# Patient Record
Sex: MALE | Race: WHITE | Employment: FULL TIME | URBAN - METROPOLITAN AREA
[De-identification: names, ages, dates, MRNs, and addresses within clinical notes are randomized per-mention and may not be internally consistent; named-entity substitution may affect disease eponyms.]

---

## 2017-05-17 LAB
CHOLESTEROL, TOTAL: NORMAL MG/DL
CHOLESTEROL/HDL RATIO: NORMAL
HDLC SERPL-MCNC: NORMAL MG/DL (ref 35–70)
LDL CHOLESTEROL CALCULATED: 113 MG/DL (ref 0–160)
TRIGL SERPL-MCNC: NORMAL MG/DL
VLDLC SERPL CALC-MCNC: NORMAL MG/DL

## 2017-06-02 ENCOUNTER — TELEPHONE (OUTPATIENT)
Dept: FAMILY MEDICINE CLINIC | Age: 52
End: 2017-06-02

## 2017-06-08 ENCOUNTER — OFFICE VISIT (OUTPATIENT)
Dept: FAMILY MEDICINE CLINIC | Age: 52
End: 2017-06-08

## 2017-06-08 VITALS
HEART RATE: 56 BPM | WEIGHT: 218 LBS | SYSTOLIC BLOOD PRESSURE: 124 MMHG | RESPIRATION RATE: 10 BRPM | DIASTOLIC BLOOD PRESSURE: 74 MMHG | HEIGHT: 70 IN | BODY MASS INDEX: 31.21 KG/M2

## 2017-06-08 DIAGNOSIS — E78.5 HYPERLIPIDEMIA LDL GOAL <100: ICD-10-CM

## 2017-06-08 DIAGNOSIS — R97.20 ELEVATED PSA, LESS THAN 10 NG/ML: Primary | ICD-10-CM

## 2017-06-08 PROCEDURE — 99214 OFFICE O/P EST MOD 30 MIN: CPT | Performed by: FAMILY MEDICINE

## 2017-06-08 RX ORDER — M-VIT,TX,IRON,MINS/CALC/FOLIC 27MG-0.4MG
1 TABLET ORAL DAILY
COMMUNITY

## 2017-06-08 RX ORDER — CIPROFLOXACIN 500 MG/1
500 TABLET, FILM COATED ORAL 2 TIMES DAILY
Qty: 28 TABLET | Refills: 0 | Status: SHIPPED | OUTPATIENT
Start: 2017-06-08 | End: 2017-09-28 | Stop reason: ALTCHOICE

## 2017-06-08 ASSESSMENT — PATIENT HEALTH QUESTIONNAIRE - PHQ9
2. FEELING DOWN, DEPRESSED OR HOPELESS: 0
SUM OF ALL RESPONSES TO PHQ9 QUESTIONS 1 & 2: 0
1. LITTLE INTEREST OR PLEASURE IN DOING THINGS: 0
SUM OF ALL RESPONSES TO PHQ QUESTIONS 1-9: 0

## 2017-06-08 ASSESSMENT — ENCOUNTER SYMPTOMS
EYE PAIN: 0
SORE THROAT: 0
ABDOMINAL PAIN: 0
CONSTIPATION: 0
COUGH: 0
BACK PAIN: 0
TROUBLE SWALLOWING: 0
NAUSEA: 0
CHEST TIGHTNESS: 0
BLOOD IN STOOL: 0
SHORTNESS OF BREATH: 0

## 2017-09-13 DIAGNOSIS — R97.20 ELEVATED PSA, LESS THAN 10 NG/ML: ICD-10-CM

## 2017-09-14 ENCOUNTER — TELEPHONE (OUTPATIENT)
Dept: FAMILY MEDICINE CLINIC | Age: 52
End: 2017-09-14

## 2017-09-14 DIAGNOSIS — R97.20 ELEVATED PSA, LESS THAN 10 NG/ML: Primary | ICD-10-CM

## 2017-09-14 DIAGNOSIS — Z80.42 FAMILY HISTORY OF PROSTATE CANCER: ICD-10-CM

## 2017-09-28 ENCOUNTER — OFFICE VISIT (OUTPATIENT)
Dept: UROLOGY | Age: 52
End: 2017-09-28
Payer: COMMERCIAL

## 2017-09-28 VITALS
WEIGHT: 206 LBS | HEIGHT: 70 IN | SYSTOLIC BLOOD PRESSURE: 116 MMHG | DIASTOLIC BLOOD PRESSURE: 78 MMHG | BODY MASS INDEX: 29.49 KG/M2

## 2017-09-28 DIAGNOSIS — R97.20 ELEVATED PSA: Primary | ICD-10-CM

## 2017-09-28 LAB
BILIRUBIN URINE: NEGATIVE
BLOOD URINE, POC: NORMAL
CHARACTER, URINE: CLEAR
COLOR, URINE: YELLOW
GLUCOSE URINE: NEGATIVE MG/DL
KETONES, URINE: NEGATIVE
LEUKOCYTE CLUMPS, URINE: NEGATIVE
NITRITE, URINE: NEGATIVE
PH, URINE: 5.5
PROTEIN, URINE: NEGATIVE MG/DL
SPECIFIC GRAVITY, URINE: <= 1.005 (ref 1–1.03)
UROBILINOGEN, URINE: 0.2 EU/DL

## 2017-09-28 PROCEDURE — 99244 OFF/OP CNSLTJ NEW/EST MOD 40: CPT | Performed by: UROLOGY

## 2017-09-28 PROCEDURE — 81003 URINALYSIS AUTO W/O SCOPE: CPT | Performed by: UROLOGY

## 2017-09-28 ASSESSMENT — ENCOUNTER SYMPTOMS
COLOR CHANGE: 0
CONSTIPATION: 0
EYE PAIN: 0
BACK PAIN: 0
EYE ITCHING: 0
CHEST TIGHTNESS: 0
DIARRHEA: 0
SHORTNESS OF BREATH: 0

## 2017-11-29 LAB — PROSTATE SPECIFIC ANTIGEN: 4.4 NG/ML

## 2018-03-08 LAB — PROSTATE SPECIFIC ANTIGEN: 3.47 NG/ML

## 2018-03-16 ENCOUNTER — OFFICE VISIT (OUTPATIENT)
Dept: UROLOGY | Age: 53
End: 2018-03-16
Payer: COMMERCIAL

## 2018-03-16 VITALS
DIASTOLIC BLOOD PRESSURE: 76 MMHG | HEIGHT: 70 IN | WEIGHT: 200 LBS | BODY MASS INDEX: 28.63 KG/M2 | SYSTOLIC BLOOD PRESSURE: 122 MMHG

## 2018-03-16 DIAGNOSIS — R97.20 ELEVATED PSA: Primary | ICD-10-CM

## 2018-03-16 LAB
BILIRUBIN URINE: NEGATIVE
BLOOD URINE, POC: NEGATIVE
CHARACTER, URINE: CLEAR
COLOR, URINE: YELLOW
GLUCOSE URINE: NEGATIVE MG/DL
KETONES, URINE: NEGATIVE
LEUKOCYTE CLUMPS, URINE: NEGATIVE
NITRITE, URINE: NEGATIVE
PH, URINE: 6
PROTEIN, URINE: NEGATIVE MG/DL
SPECIFIC GRAVITY, URINE: 1.02 (ref 1–1.03)
UROBILINOGEN, URINE: 0.2 EU/DL

## 2018-03-16 PROCEDURE — 99213 OFFICE O/P EST LOW 20 MIN: CPT | Performed by: NURSE PRACTITIONER

## 2018-03-16 PROCEDURE — 81003 URINALYSIS AUTO W/O SCOPE: CPT | Performed by: NURSE PRACTITIONER

## 2018-03-16 NOTE — PROGRESS NOTES
examination with appropriate mood and affect. HENT:   Head:        Normocephalic and atraumatic. Mouth/Throat:         Mucous membranes are normal.   Eyes:         EOM are normal. No scleral icterus. PERRLA. Neck:        Supple, symmetrical, trachea midline  Pulmonary/Chest:      Chest symmetric with normal A/P diameter. Normal respiratory rate and rhthym. No use of accessory muscles. Abdominal:         Soft. No tenderness. Bowel sounds present. Musculoskeletal:         Normal range of motion. No edema or tenderness of lower extremities. Extremities: No cyanosis, clubbing, or edema present. Neurological:        Alert and oriented. No cranial nerve deficit. [facility]:        Normal mood and affect. Labs   Urine dip demonstrates   Results for POC orders placed in visit on 03/16/18   POCT Urinalysis No Micro (Auto)   Result Value Ref Range    Glucose, Ur Negative NEGATIVE mg/dl    Bilirubin Urine Negative     Ketones, Urine Negative NEGATIVE    Specific Gravity, Urine 1.025 1.002 - 1.03    Blood, UA POC Negative NEGATIVE    pH, Urine 6.00 5.0 - 9.0    Protein, Urine Negative NEGATIVE mg/dl    Urobilinogen, Urine 0.20 0.0 - 1.0 eu/dl    Nitrite, Urine Negative NEGATIVE    Leukocyte Clumps, Urine Negative NEGATIVE    Color, Urine Yellow YELLOW-STR    Character, Urine Clear [id number]       Patients recent PSA values are as follows  Lab Results   Component Value Date    PSA 4.40 11/29/2017        Recent BUN/Creatinine:  Lab Results   Component Value Date    BUN 16 09/08/2012    CREATININE 1.0 09/08/2012       Radiology  No recent imaging        Assessment & Plan:     Elevated PSA    Kamran Hope follows up today for elevated PSA's. His PSA remains elevated at 3.47. His PSA did jump up to 4.4, however came back down. Compared to 6 months ago, PSA is very similar. Plan to check PSA every 2 months, if continual trend upwards, will need to call patient to schedule for prostate biopsy.  We did discuss

## 2018-05-22 LAB — PROSTATE SPECIFIC ANTIGEN: 4.11 NG/ML

## 2018-07-31 LAB — PROSTATE SPECIFIC ANTIGEN: 3.25 NG/ML

## 2018-10-31 LAB — PROSTATE SPECIFIC ANTIGEN: 3.07 NG/ML

## 2018-11-09 ENCOUNTER — OFFICE VISIT (OUTPATIENT)
Dept: UROLOGY | Age: 53
End: 2018-11-09
Payer: COMMERCIAL

## 2018-11-09 VITALS
HEIGHT: 70 IN | BODY MASS INDEX: 32.07 KG/M2 | DIASTOLIC BLOOD PRESSURE: 86 MMHG | SYSTOLIC BLOOD PRESSURE: 128 MMHG | WEIGHT: 224 LBS

## 2018-11-09 DIAGNOSIS — R97.20 ELEVATED PSA: Primary | ICD-10-CM

## 2018-11-09 LAB
BILIRUBIN URINE: NEGATIVE
BLOOD URINE, POC: NEGATIVE
CHARACTER, URINE: CLEAR
COLOR, URINE: YELLOW
GLUCOSE URINE: NEGATIVE MG/DL
KETONES, URINE: NEGATIVE
LEUKOCYTE CLUMPS, URINE: NEGATIVE
NITRITE, URINE: NEGATIVE
PH, URINE: 5.5
PROTEIN, URINE: NEGATIVE MG/DL
SPECIFIC GRAVITY, URINE: 1.01 (ref 1–1.03)
UROBILINOGEN, URINE: 0.2 EU/DL

## 2018-11-09 PROCEDURE — 99213 OFFICE O/P EST LOW 20 MIN: CPT | Performed by: NURSE PRACTITIONER

## 2018-11-09 PROCEDURE — 81003 URINALYSIS AUTO W/O SCOPE: CPT | Performed by: NURSE PRACTITIONER

## 2019-01-02 ENCOUNTER — TELEPHONE (OUTPATIENT)
Dept: UROLOGY | Age: 54
End: 2019-01-02

## 2019-01-02 RX ORDER — SILDENAFIL CITRATE 20 MG/1
20-100 TABLET ORAL PRN
Qty: 30 TABLET | Refills: 3 | Status: SHIPPED | OUTPATIENT
Start: 2019-01-02 | End: 2019-05-22 | Stop reason: SDUPTHER

## 2019-02-16 ENCOUNTER — HOSPITAL ENCOUNTER (OUTPATIENT)
Age: 54
Setting detail: OUTPATIENT SURGERY
Discharge: HOME | End: 2019-02-16
Attending: OPHTHALMOLOGY | Admitting: OPHTHALMOLOGY
Payer: COMMERCIAL

## 2019-02-16 ENCOUNTER — ANESTHESIA (OUTPATIENT)
Dept: OPERATING ROOM | Age: 54
End: 2019-02-16
Payer: COMMERCIAL

## 2019-02-16 ENCOUNTER — ANESTHESIA EVENT (OUTPATIENT)
Dept: OPERATING ROOM | Age: 54
End: 2019-02-16
Payer: COMMERCIAL

## 2019-02-16 VITALS
SYSTOLIC BLOOD PRESSURE: 103 MMHG | HEART RATE: 60 BPM | OXYGEN SATURATION: 97 % | WEIGHT: 210 LBS | BODY MASS INDEX: 30.06 KG/M2 | DIASTOLIC BLOOD PRESSURE: 56 MMHG | RESPIRATION RATE: 16 BRPM | HEIGHT: 70 IN | TEMPERATURE: 97.7 F

## 2019-02-16 VITALS — DIASTOLIC BLOOD PRESSURE: 53 MMHG | SYSTOLIC BLOOD PRESSURE: 112 MMHG | OXYGEN SATURATION: 97 % | TEMPERATURE: 97.8 F

## 2019-02-16 PROBLEM — H33.021 RETINAL DETACHMENT OF RIGHT EYE WITH MULTIPLE RETINAL TEARS: Status: ACTIVE | Noted: 2019-02-16

## 2019-02-16 LAB
EKG ATRIAL RATE: 55 BPM
EKG P AXIS: 38 DEGREES
EKG P-R INTERVAL: 168 MS
EKG Q-T INTERVAL: 404 MS
EKG QRS DURATION: 84 MS
EKG QTC CALCULATION (BAZETT): 386 MS
EKG R AXIS: -8 DEGREES
EKG T AXIS: 13 DEGREES
EKG VENTRICULAR RATE: 55 BPM

## 2019-02-16 PROCEDURE — 3700000000 HC ANESTHESIA ATTENDED CARE: Performed by: OPHTHALMOLOGY

## 2019-02-16 PROCEDURE — 6370000000 HC RX 637 (ALT 250 FOR IP): Performed by: ANESTHESIOLOGY

## 2019-02-16 PROCEDURE — 2709999900 HC NON-CHARGEABLE SUPPLY: Performed by: OPHTHALMOLOGY

## 2019-02-16 PROCEDURE — 6370000000 HC RX 637 (ALT 250 FOR IP): Performed by: OPHTHALMOLOGY

## 2019-02-16 PROCEDURE — 7100000011 HC PHASE II RECOVERY - ADDTL 15 MIN: Performed by: OPHTHALMOLOGY

## 2019-02-16 PROCEDURE — 3600000004 HC SURGERY LEVEL 4 BASE: Performed by: OPHTHALMOLOGY

## 2019-02-16 PROCEDURE — 3600000014 HC SURGERY LEVEL 4 ADDTL 15MIN: Performed by: OPHTHALMOLOGY

## 2019-02-16 PROCEDURE — 93005 ELECTROCARDIOGRAM TRACING: CPT

## 2019-02-16 PROCEDURE — 2500000003 HC RX 250 WO HCPCS: Performed by: OPHTHALMOLOGY

## 2019-02-16 PROCEDURE — 6360000002 HC RX W HCPCS: Performed by: OPHTHALMOLOGY

## 2019-02-16 PROCEDURE — 3700000001 HC ADD 15 MINUTES (ANESTHESIA): Performed by: OPHTHALMOLOGY

## 2019-02-16 PROCEDURE — 6360000002 HC RX W HCPCS: Performed by: NURSE ANESTHETIST, CERTIFIED REGISTERED

## 2019-02-16 PROCEDURE — 2580000003 HC RX 258: Performed by: ANESTHESIOLOGY

## 2019-02-16 PROCEDURE — 2500000003 HC RX 250 WO HCPCS: Performed by: NURSE ANESTHETIST, CERTIFIED REGISTERED

## 2019-02-16 PROCEDURE — 7100000010 HC PHASE II RECOVERY - FIRST 15 MIN: Performed by: OPHTHALMOLOGY

## 2019-02-16 PROCEDURE — 2580000003 HC RX 258: Performed by: OPHTHALMOLOGY

## 2019-02-16 RX ORDER — SODIUM CHLORIDE 0.9 % (FLUSH) 0.9 %
10 SYRINGE (ML) INJECTION EVERY 12 HOURS SCHEDULED
Status: DISCONTINUED | OUTPATIENT
Start: 2019-02-16 | End: 2019-02-16 | Stop reason: HOSPADM

## 2019-02-16 RX ORDER — LIDOCAINE HYDROCHLORIDE 10 MG/ML
INJECTION, SOLUTION EPIDURAL; INFILTRATION; INTRACAUDAL; PERINEURAL PRN
Status: DISCONTINUED | OUTPATIENT
Start: 2019-02-16 | End: 2019-02-16 | Stop reason: SDUPTHER

## 2019-02-16 RX ORDER — TROPICAMIDE 10 MG/ML
1 SOLUTION/ DROPS OPHTHALMIC EVERY 5 MIN PRN
Status: COMPLETED | OUTPATIENT
Start: 2019-02-16 | End: 2019-02-16

## 2019-02-16 RX ORDER — LIDOCAINE HYDROCHLORIDE 10 MG/ML
1 INJECTION, SOLUTION EPIDURAL; INFILTRATION; INTRACAUDAL; PERINEURAL
Status: DISCONTINUED | OUTPATIENT
Start: 2019-02-16 | End: 2019-02-16 | Stop reason: HOSPADM

## 2019-02-16 RX ORDER — SODIUM CHLORIDE, SODIUM LACTATE, POTASSIUM CHLORIDE, CALCIUM CHLORIDE 600; 310; 30; 20 MG/100ML; MG/100ML; MG/100ML; MG/100ML
INJECTION, SOLUTION INTRAVENOUS CONTINUOUS
Status: DISCONTINUED | OUTPATIENT
Start: 2019-02-16 | End: 2019-02-16

## 2019-02-16 RX ORDER — PHENYLEPHRINE HYDROCHLORIDE 100 MG/ML
1 SOLUTION/ DROPS OPHTHALMIC EVERY 5 MIN PRN
Status: COMPLETED | OUTPATIENT
Start: 2019-02-16 | End: 2019-02-16

## 2019-02-16 RX ORDER — CEFAZOLIN SODIUM 500 MG/2.2ML
INJECTION, POWDER, FOR SOLUTION INTRAMUSCULAR; INTRAVENOUS PRN
Status: DISCONTINUED | OUTPATIENT
Start: 2019-02-16 | End: 2019-02-16 | Stop reason: ALTCHOICE

## 2019-02-16 RX ORDER — MIDAZOLAM HYDROCHLORIDE 1 MG/ML
1 INJECTION INTRAMUSCULAR; INTRAVENOUS EVERY 10 MIN PRN
Status: DISCONTINUED | OUTPATIENT
Start: 2019-02-16 | End: 2019-02-16 | Stop reason: HOSPADM

## 2019-02-16 RX ORDER — ACETAMINOPHEN 500 MG
1000 TABLET ORAL ONCE
Status: COMPLETED | OUTPATIENT
Start: 2019-02-16 | End: 2019-02-16

## 2019-02-16 RX ORDER — FENTANYL CITRATE 50 UG/ML
25 INJECTION, SOLUTION INTRAMUSCULAR; INTRAVENOUS EVERY 5 MIN PRN
Status: DISCONTINUED | OUTPATIENT
Start: 2019-02-16 | End: 2019-02-16 | Stop reason: HOSPADM

## 2019-02-16 RX ORDER — SODIUM CHLORIDE, SODIUM LACTATE, POTASSIUM CHLORIDE, CALCIUM CHLORIDE 600; 310; 30; 20 MG/100ML; MG/100ML; MG/100ML; MG/100ML
INJECTION, SOLUTION INTRAVENOUS CONTINUOUS
Status: DISCONTINUED | OUTPATIENT
Start: 2019-02-16 | End: 2019-02-16 | Stop reason: HOSPADM

## 2019-02-16 RX ORDER — ATROPINE SULFATE 10 MG/ML
1 SOLUTION/ DROPS OPHTHALMIC
Status: COMPLETED | OUTPATIENT
Start: 2019-02-16 | End: 2019-02-16

## 2019-02-16 RX ORDER — TOBRAMYCIN AND DEXAMETHASONE 3; 1 MG/ML; MG/ML
1 SUSPENSION/ DROPS OPHTHALMIC
Status: COMPLETED | OUTPATIENT
Start: 2019-02-16 | End: 2019-02-16

## 2019-02-16 RX ORDER — SODIUM CHLORIDE 0.9 % (FLUSH) 0.9 %
10 SYRINGE (ML) INJECTION PRN
Status: DISCONTINUED | OUTPATIENT
Start: 2019-02-16 | End: 2019-02-16 | Stop reason: HOSPADM

## 2019-02-16 RX ORDER — ERYTHROMYCIN 5 MG/G
OINTMENT OPHTHALMIC PRN
Status: DISCONTINUED | OUTPATIENT
Start: 2019-02-16 | End: 2019-02-16 | Stop reason: ALTCHOICE

## 2019-02-16 RX ORDER — BUPIVACAINE HYDROCHLORIDE 7.5 MG/ML
INJECTION, SOLUTION EPIDURAL; RETROBULBAR PRN
Status: DISCONTINUED | OUTPATIENT
Start: 2019-02-16 | End: 2019-02-16 | Stop reason: ALTCHOICE

## 2019-02-16 RX ORDER — PROPOFOL 10 MG/ML
INJECTION, EMULSION INTRAVENOUS PRN
Status: DISCONTINUED | OUTPATIENT
Start: 2019-02-16 | End: 2019-02-16 | Stop reason: SDUPTHER

## 2019-02-16 RX ORDER — LIDOCAINE HYDROCHLORIDE 20 MG/ML
INJECTION, SOLUTION INFILTRATION; PERINEURAL PRN
Status: DISCONTINUED | OUTPATIENT
Start: 2019-02-16 | End: 2019-02-16 | Stop reason: ALTCHOICE

## 2019-02-16 RX ADMIN — PHENYLEPHRINE HYDROCHLORIDE 1 DROP: 100 SOLUTION/ DROPS OPHTHALMIC at 07:24

## 2019-02-16 RX ADMIN — PHENYLEPHRINE HYDROCHLORIDE 1 DROP: 100 SOLUTION/ DROPS OPHTHALMIC at 07:36

## 2019-02-16 RX ADMIN — SODIUM CHLORIDE, POTASSIUM CHLORIDE, SODIUM LACTATE AND CALCIUM CHLORIDE: 600; 310; 30; 20 INJECTION, SOLUTION INTRAVENOUS at 07:40

## 2019-02-16 RX ADMIN — TOBRAMYCIN AND DEXAMETHASONE 1 DROP: 3; 1 SUSPENSION/ DROPS OPHTHALMIC at 07:37

## 2019-02-16 RX ADMIN — ACETAMINOPHEN 1000 MG: 500 TABLET ORAL at 09:44

## 2019-02-16 RX ADMIN — TROPICAMIDE 1 DROP: 10 SOLUTION/ DROPS OPHTHALMIC at 07:25

## 2019-02-16 RX ADMIN — TROPICAMIDE 1 DROP: 10 SOLUTION/ DROPS OPHTHALMIC at 07:30

## 2019-02-16 RX ADMIN — PROPOFOL 100 MG: 10 INJECTION, EMULSION INTRAVENOUS at 08:26

## 2019-02-16 RX ADMIN — LIDOCAINE HYDROCHLORIDE 25 MG: 10 INJECTION, SOLUTION EPIDURAL; INFILTRATION; INTRACAUDAL; PERINEURAL at 08:26

## 2019-02-16 RX ADMIN — TROPICAMIDE 1 DROP: 10 SOLUTION/ DROPS OPHTHALMIC at 07:36

## 2019-02-16 RX ADMIN — PHENYLEPHRINE HYDROCHLORIDE 1 DROP: 100 SOLUTION/ DROPS OPHTHALMIC at 07:30

## 2019-02-16 RX ADMIN — ATROPINE SULFATE 1 DROP: 10 SOLUTION OPHTHALMIC at 07:37

## 2019-02-16 ASSESSMENT — PULMONARY FUNCTION TESTS
PIF_VALUE: 1
PIF_VALUE: 0
PIF_VALUE: 1
PIF_VALUE: 0
PIF_VALUE: 0
PIF_VALUE: 1
PIF_VALUE: 0
PIF_VALUE: 1
PIF_VALUE: 0
PIF_VALUE: 1
PIF_VALUE: 0
PIF_VALUE: 1
PIF_VALUE: 0
PIF_VALUE: 1

## 2019-02-16 ASSESSMENT — PAIN SCALES - GENERAL
PAINLEVEL_OUTOF10: 0
PAINLEVEL_OUTOF10: 3
PAINLEVEL_OUTOF10: 0
PAINLEVEL_OUTOF10: 3

## 2019-02-16 ASSESSMENT — PAIN - FUNCTIONAL ASSESSMENT: PAIN_FUNCTIONAL_ASSESSMENT: 0-10

## 2019-05-22 RX ORDER — SILDENAFIL CITRATE 20 MG/1
20-100 TABLET ORAL PRN
Qty: 90 TABLET | Refills: 3 | Status: SHIPPED | OUTPATIENT
Start: 2019-05-22 | End: 2020-01-23 | Stop reason: SDUPTHER

## 2019-05-22 NOTE — TELEPHONE ENCOUNTER
Patient is asking for a increase in total number of pills he gets, instead of 3 he is asking for more.

## 2019-05-23 ENCOUNTER — TELEPHONE (OUTPATIENT)
Dept: UROLOGY | Age: 54
End: 2019-05-23

## 2019-09-12 ENCOUNTER — NURSE ONLY (OUTPATIENT)
Dept: LAB | Age: 54
End: 2019-09-12

## 2020-01-23 ENCOUNTER — TELEPHONE (OUTPATIENT)
Dept: UROLOGY | Age: 55
End: 2020-01-23

## 2020-01-23 RX ORDER — SILDENAFIL CITRATE 20 MG/1
20-100 TABLET ORAL PRN
Qty: 90 TABLET | Refills: 3 | Status: SHIPPED | OUTPATIENT
Start: 2020-01-23 | End: 2020-09-10 | Stop reason: SDUPTHER

## 2020-01-23 NOTE — TELEPHONE ENCOUNTER
Voicemail left for the patient stating new order was placed for PSA and to return the call to the office for any questions. toileting/walking/standing

## 2020-01-28 LAB — PROSTATE SPECIFIC ANTIGEN: 4.51 NG/ML

## 2020-01-30 ENCOUNTER — OFFICE VISIT (OUTPATIENT)
Dept: UROLOGY | Age: 55
End: 2020-01-30
Payer: COMMERCIAL

## 2020-01-30 VITALS
SYSTOLIC BLOOD PRESSURE: 128 MMHG | DIASTOLIC BLOOD PRESSURE: 88 MMHG | HEIGHT: 70 IN | BODY MASS INDEX: 31.5 KG/M2 | WEIGHT: 220 LBS

## 2020-01-30 PROBLEM — R97.20 ELEVATED PSA: Status: ACTIVE | Noted: 2020-01-30

## 2020-01-30 PROBLEM — N52.9 ERECTILE DYSFUNCTION: Status: ACTIVE | Noted: 2020-01-30

## 2020-01-30 PROCEDURE — 1036F TOBACCO NON-USER: CPT | Performed by: NURSE PRACTITIONER

## 2020-01-30 PROCEDURE — G8417 CALC BMI ABV UP PARAM F/U: HCPCS | Performed by: NURSE PRACTITIONER

## 2020-01-30 PROCEDURE — G8484 FLU IMMUNIZE NO ADMIN: HCPCS | Performed by: NURSE PRACTITIONER

## 2020-01-30 PROCEDURE — G8427 DOCREV CUR MEDS BY ELIG CLIN: HCPCS | Performed by: NURSE PRACTITIONER

## 2020-01-30 PROCEDURE — 99213 OFFICE O/P EST LOW 20 MIN: CPT | Performed by: NURSE PRACTITIONER

## 2020-01-30 PROCEDURE — 3017F COLORECTAL CA SCREEN DOC REV: CPT | Performed by: NURSE PRACTITIONER

## 2020-01-30 NOTE — PROGRESS NOTES
elevated psa. Diagnoses and all orders for this visit:    Elevated PSA  -     PSA Prostatic Specific Antigen; Standing    Erectile dysfunction, unspecified erectile dysfunction type    Continue to monitor PSA every 3 months. If rises, will bring in office sooner to discuss biopsy. Patient agreed with plan of care. Return in about 1 year (around 1/30/2021) for elevated PSA.     Alex Gtz, MAURICE-CNP  Urology

## 2020-06-09 LAB — PROSTATE SPECIFIC ANTIGEN: 5.6 NG/ML

## 2020-06-10 ENCOUNTER — TELEPHONE (OUTPATIENT)
Dept: UROLOGY | Age: 55
End: 2020-06-10

## 2020-06-10 NOTE — TELEPHONE ENCOUNTER
I called and notified the patient that his psa is 5.60. Patient stated that he has started cycling again and rode for 20miles on 06/07/20. He thinks this might have effected his results. Patient made an appt for 07/10/20 at 8:30. I know you are rounding please let me know if I need to change this appt.

## 2020-07-16 ENCOUNTER — VIRTUAL VISIT (OUTPATIENT)
Dept: UROLOGY | Age: 55
End: 2020-07-16
Payer: COMMERCIAL

## 2020-07-16 PROCEDURE — G8417 CALC BMI ABV UP PARAM F/U: HCPCS | Performed by: UROLOGY

## 2020-07-16 PROCEDURE — 1036F TOBACCO NON-USER: CPT | Performed by: UROLOGY

## 2020-07-16 PROCEDURE — G8427 DOCREV CUR MEDS BY ELIG CLIN: HCPCS | Performed by: UROLOGY

## 2020-07-16 PROCEDURE — 99212 OFFICE O/P EST SF 10 MIN: CPT | Performed by: UROLOGY

## 2020-07-16 PROCEDURE — 3017F COLORECTAL CA SCREEN DOC REV: CPT | Performed by: UROLOGY

## 2020-07-16 NOTE — PROGRESS NOTES
Component      Latest Ref Rng & Units 2020 2020 10/31/2018 2018          12:00 AM 12:00 AM 12:00 AM 12:00 AM   Prostatic Specific Ag      ng/mL 5.60 4.51 3.07 3.25     Component      Latest Ref Rng & Units 2018 3/8/2018 2017          12:00 AM 12:00 AM 12:00 AM   Prostatic Specific Ag      ng/mL 4.11 3.47 340   53year-old healthy white male with the above PSA parameters over the last 3 years. Previous urologist, Dr. Kris Woodard recommended conservative therapy based on the PSA values. I reviewed the upper limit of normal total PSA for a man in his 46s which is 3.5 NG/mL. He has no new voiding symptoms. He states his KAVYA has been normal.  His brother diagnosed and treated with RALP for adenocarcinoma the prostate. I discussed the value of obtaining both a total and free PSA. I told him that this gives me additional guidance on whether to recommend ultrasound-guided prostate biopsies or continue to monitor his PSA periodically. He agrees to have the blood drawn at the same laboratory. A total and free PSA order has been placed. I will call him with the results. 2020    TELEHEALTH EVALUATION -- Audio/Visual (During [id number] public health emergency)    HPI:    Joyce Brush (:  1965) has requested an audio/video evaluation for the following concern(s):    Elevated PSA. See the above documentation. Review of Systems    Prior to Visit Medications    Medication Sig Taking?  Authorizing Provider   Tetrahydrozoline HCl (EYE DROPS REGULAR OP) Apply to eye Yes Historical Provider, MD   sildenafil (REVATIO) 20 MG tablet Take 1-5 tablets by mouth as needed (ED) Yes Luis Alberto Grant APRN - CNP   aspirin 81 MG tablet Take 81 mg by mouth daily Yes Historical Provider, MD   Multiple Vitamins-Minerals (THERAPEUTIC MULTIVITAMIN-MINERALS) tablet Take 1 tablet by mouth daily Yes Historical Provider, MD       Social History     Tobacco Use    Smoking status: Never Smoker    Smokeless tobacco: Never Used   Substance Use Topics    Alcohol use: Yes     Alcohol/week: 6.0 standard drinks     Types: 6 Cans of beer per week     Comment: social drinker    Drug use: No        Allergies   Allergen Reactions    Latex Rash       PHYSICAL EXAMINATION:  [ INSTRUCTIONS:  \"[x]\" Indicates a positive item  \"[]\" Indicates a negative item  -- DELETE ALL ITEMS NOT EXAMINED]  Vital Signs: (As obtained by patient/caregiver or practitioner observation)    Blood pressure-  Heart rate-    Respiratory rate-    Temperature-  Pulse oximetry-     Constitutional: [] Appears well-developed and well-nourished [] No apparent distress      [] Abnormal-   Mental status  [] Alert and awake  [] Oriented to person/place/time []Able to follow commands      Eyes:  EOM    []  Normal  [] Abnormal-  Sclera  []  Normal  [] Abnormal -         Discharge []  None visible  [] Abnormal -    HENT:   [] Normocephalic, atraumatic. [] Abnormal   [] Mouth/Throat: Mucous membranes are moist.     External Ears [] Normal  [] Abnormal-     Neck: [] No visualized mass     Pulmonary/Chest: [] Respiratory effort normal.  [] No visualized signs of difficulty breathing or respiratory distress        [] Abnormal-      Musculoskeletal:   [] Normal gait with no signs of ataxia         [] Normal range of motion of neck        [] Abnormal-       Neurological:        [] No Facial Asymmetry (Cranial nerve 7 motor function) (limited exam to video visit)          [] No gaze palsy        [] Abnormal-         Skin:        [] No significant exanthematous lesions or discoloration noted on facial skin         [] Abnormal-            Psychiatric:       [] Normal Affect [] No Hallucinations        [] Abnormal-     Other pertinent observable physical exam findings-     ASSESSMENT/PLAN:  1. Elevated PSA. Plan of care: Total and free PSA will be obtained. The results will determine the direction of further evaluation. No follow-ups on file.     Paulette Downs

## 2020-07-17 LAB
PROSTATE SPECIFIC ANTIGEN FREE: 1 NG/ML
PROSTATE SPECIFIC ANTIGEN PERCENT FREE: 21 %
PSA-PROSTATE SPECIFIC AG: 4.8

## 2020-07-21 ENCOUNTER — TELEPHONE (OUTPATIENT)
Dept: UROLOGY | Age: 55
End: 2020-07-21

## 2020-07-21 NOTE — TELEPHONE ENCOUNTER
Let him know his total PSA is 4.8 NG/mL which is high for his age and his free PSA is 21%. A percent free PSA below 25% along with an elevated total PSA means he has 2 tests that are abnormal and as such we will need prostate ultrasound with ultrasound-guided biopsies. If he wants an appointment to discuss this further please schedule him for an office visit.   If he wants to go ahead send me a note so I can send in the prescriptions for the antibiotics

## 2020-07-21 NOTE — TELEPHONE ENCOUNTER
Patient advised of the PSA results and recommendations. He declined the appointment to discuss but would like for the [address] [place] [address] [address] guided biopsy scheduled. He uses [facility] for the prescriptions.

## 2020-07-21 NOTE — TELEPHONE ENCOUNTER
Patient needs to come in for a rectal swab before I order the antibiotics, because he could be resistant to Cipro.

## 2020-07-23 ENCOUNTER — NURSE ONLY (OUTPATIENT)
Dept: UROLOGY | Age: 55
End: 2020-07-23

## 2020-07-23 VITALS — TEMPERATURE: 97.2 F

## 2020-07-28 ENCOUNTER — TELEPHONE (OUTPATIENT)
Dept: UROLOGY | Age: 55
End: 2020-07-28

## 2020-07-28 LAB — MISC. #1 REFERENCE GROUP TEST: NORMAL

## 2020-07-28 RX ORDER — AMOXICILLIN AND CLAVULANATE POTASSIUM 875; 125 MG/1; MG/1
TABLET, FILM COATED ORAL
Qty: 6 TABLET | Refills: 0 | Status: SHIPPED | OUTPATIENT
Start: 2020-07-28 | End: 2021-01-28

## 2020-07-28 RX ORDER — CEFTRIAXONE 1 G/1
1 INJECTION, POWDER, FOR SOLUTION INTRAMUSCULAR; INTRAVENOUS EVERY 24 HOURS
Qty: 1 G | Refills: 0 | Status: SHIPPED | OUTPATIENT
Start: 2020-07-28 | End: 2020-08-18 | Stop reason: SDUPTHER

## 2020-07-28 NOTE — TELEPHONE ENCOUNTER
Patient scheduled for a Trus Bx with Dr Charlie Charles on 8/2/20. Instructions mailed to the patient. Patient informed of the antibiotics at the pharmacy.

## 2020-07-28 NOTE — TELEPHONE ENCOUNTER
PROSTATE ULTRASOUND/BIOPSY    Nay [place]      1965    You are scheduled for the above procedure on:    8/20/20   at:    9:30 am    Please note that you will be responsible for any charges that are not paid by your insurance. The prostate biopsy specimens are sent to a Lab and their charges are billed to you separate from the office charges. [facility]    DESCRIPTION OF PROSTATIC ULTRASOUND AND BIOPSY  Ultrasound uses harmless sound waves to give us pictures of the prostate, and allows us to accurately guide a biopsy needle to areas of concern. The procedure is done in the office. Initially, a complete prostate exam is done. Next the ultrasound probe, finger-like in size and shape, is placed into the rectum. With slight movement of the probe, many different views are obtained. A prostate block may or may not be given at this time. A spring loaded fine needle is place through the probe and directed into the prostate. Six or more biopsies are usually taken. These biopsies are not usually painful. The entire exam takes 20-30 minutes. POSSIBLE RISKS OF THE PROCEDURE  Blood may be noted in the stool and urine for a few days. Blood may be seen in the semen for up to a month. Infection of the prostate or in the urine can occur even with antibiotic preparation. You should call if you develop fever, chills, severe pain, or have continuous or significant bleeding. If you have known hemorrhoids, you may have more bleeding and discomfort in the rectal area following the biopsy. This may last for 3-5 days afterwards. If any concerns arise, please call the office. PREPARATION  1.  DO NOT TAKE: ASPIRIN, MOTRIN, PERSANTINE, PLAVIX, IBUPROFEN, ARTHRITIS TYPE MEDICATION, COUMADIN, FISH OIL, VIT E, AND BLOOD THINNERS FOR 7 DAYS BEFORE THE BIOPSY AND 3 DAYS AFTER THE BIOPSY. SEE ATTACHED LIST OF ADDITIONAL MEDICATIONS.   YOU MAY TAKE TYLENOL IF

## 2020-08-18 ENCOUNTER — TELEPHONE (OUTPATIENT)
Dept: UROLOGY | Age: 55
End: 2020-08-18

## 2020-08-18 RX ORDER — CEFTRIAXONE 1 G/1
1 INJECTION, POWDER, FOR SOLUTION INTRAMUSCULAR; INTRAVENOUS ONCE
Qty: 1 G | Refills: 0 | Status: SHIPPED | OUTPATIENT
Start: 2020-08-18 | End: 2020-08-18

## 2020-08-18 NOTE — TELEPHONE ENCOUNTER
Otto Griffin at [facility] [phone] stated they need a prescription sent for sterile water or lidocaine. I pended the order.

## 2020-08-18 NOTE — TELEPHONE ENCOUNTER
[facility] [address] [facility] is able to get Rocephin 1gm in by tomorrow. Patient will be able to pick it up on Thursday prior to his biopsy. Just need a new order to be sent to [facility] to have filled. Could you please place a new order?   Thank you

## 2020-08-20 ENCOUNTER — PROCEDURE VISIT (OUTPATIENT)
Dept: UROLOGY | Age: 55
End: 2020-08-20
Payer: COMMERCIAL

## 2020-08-20 VITALS — BODY MASS INDEX: 30.49 KG/M2 | TEMPERATURE: 97.4 F | WEIGHT: 213 LBS | HEIGHT: 70 IN

## 2020-08-20 LAB
BILIRUBIN URINE: NEGATIVE
BLOOD URINE, POC: NEGATIVE
CHARACTER, URINE: CLEAR
COLOR, URINE: YELLOW
GLUCOSE URINE: NEGATIVE MG/DL
KETONES, URINE: NEGATIVE
LEUKOCYTE CLUMPS, URINE: NEGATIVE
NITRITE, URINE: NEGATIVE
PH, URINE: 5.5 (ref 5–9)
PROTEIN, URINE: NEGATIVE MG/DL
SPECIFIC GRAVITY, URINE: 1.02 (ref 1–1.03)
UROBILINOGEN, URINE: 0.2 EU/DL (ref 0–1)

## 2020-08-20 PROCEDURE — 96372 THER/PROPH/DIAG INJ SC/IM: CPT | Performed by: UROLOGY

## 2020-08-20 PROCEDURE — 55700 PR BIOPSY OF PROSTATE,NEEDLE/PUNCH: CPT | Performed by: UROLOGY

## 2020-08-20 PROCEDURE — 76872 US TRANSRECTAL: CPT | Performed by: UROLOGY

## 2020-08-20 PROCEDURE — 81003 URINALYSIS AUTO W/O SCOPE: CPT | Performed by: UROLOGY

## 2020-08-20 NOTE — PROGRESS NOTES
26-year-old white male comes in today for prostate ultrasound with ultrasound-guided prostate biopsies for elevated PSA. His highest value that I am aware of is 5.6 NG/mL. Mr. Chelle Carlos was seen in follow up for his prostate biopsy. The biopsy was indicated and is being performed for elevated PSA. The biopsy was done without difficulty or complication. TRUS prostate biopsy note:  After obtaining informed consent, the rectal ultrasound probe was passed per rectum and the prostate visualized. A local block was performed by instilling 2% lidocaine at the base Measurements were taken and the prostate measured 51.5 x 35.0 x 38.70 mm for a total volume of 36.51 cc. At this point, prostate biopsy was performed. Two cores were taken at the base, the mid-portion, and the apex of the gland on either side for a total of 12 cores. The rectal probe was removed and there was minimal bleeding. Mr. Chlele Carlos tolerated the procedure well and there were no complications. Prostate size: 36.51 cc  Prostatic calcifications:yes Location: Bilateral  Hypoechoic areas: no   Cores [id number]  Complications: none      Assessment:      Prostate biopsy performed without difficulty. Plan:        I will see Merlin Mount back to discuss the pathology in 1-2 weeks. Further recommendations will be based on these results.

## 2020-08-20 NOTE — PROGRESS NOTES
Procedure: Trus/Biopsy  Pt name and birth date verified Yes  Patient agrees Dr. Les Haro is taking biopsies of the prostate Yes  Patient took Enema 2 hours prior to procedure Yes  Patient took 2 pill(s) of Augentin day before procedure, day of, and will the day after Yes  Has patient eaten today? Patient given orange juice  Patient stopped all blood thinners prior to surgery Yes    Patient has given me verbal consent to perform Gentamicin Injection yes    Following Dr. Landon Mason of [facility].   Ceftriaxone 1gram GIVEN I.M right UOQ HIP by Raysa Fall CMA      Ceftriaxone 1gram  [id number]  EXP-08/2020  [id number]    Lidocaine 1%  [id number]  EXP-02/2021  [id number]    Patient brought own supplies YES

## 2020-08-24 ENCOUNTER — TELEPHONE (OUTPATIENT)
Dept: UROLOGY | Age: 55
End: 2020-08-24

## 2020-09-10 RX ORDER — SILDENAFIL CITRATE 20 MG/1
20-100 TABLET ORAL PRN
Qty: 90 TABLET | Refills: 3 | Status: SHIPPED | OUTPATIENT
Start: 2020-09-10 | End: 2021-03-29 | Stop reason: SDUPTHER

## 2020-09-10 NOTE — TELEPHONE ENCOUNTER
Prescription Refill Request    The pharmacy is requesting a refill of sildenafil 20 mg, taken 1-5 tablets prn  Last prescribed on 01/23/2020    Last office visit 07/16/2020 with provider Dr Asha Nguyen  Next office visit 01/28/2020 with provider Dr Asha Nguyen    Please send refill to [facility]    Thank you.

## 2021-01-20 LAB
PROSTATE SPECIFIC ANTIGEN FREE: 0.8 NG/ML
PROSTATE SPECIFIC ANTIGEN PERCENT FREE: 17 %
PSA-PROSTATE SPECIFIC AG: 4.8

## 2021-01-28 ENCOUNTER — OFFICE VISIT (OUTPATIENT)
Dept: UROLOGY | Age: 56
End: 2021-01-28
Payer: COMMERCIAL

## 2021-01-28 VITALS — HEIGHT: 70 IN | WEIGHT: 220 LBS | BODY MASS INDEX: 31.5 KG/M2 | TEMPERATURE: 97.2 F

## 2021-01-28 DIAGNOSIS — R97.20 ELEVATED PSA: Primary | ICD-10-CM

## 2021-01-28 PROCEDURE — 1036F TOBACCO NON-USER: CPT | Performed by: UROLOGY

## 2021-01-28 PROCEDURE — G8484 FLU IMMUNIZE NO ADMIN: HCPCS | Performed by: UROLOGY

## 2021-01-28 PROCEDURE — 3017F COLORECTAL CA SCREEN DOC REV: CPT | Performed by: UROLOGY

## 2021-01-28 PROCEDURE — 99212 OFFICE O/P EST SF 10 MIN: CPT | Performed by: UROLOGY

## 2021-01-28 PROCEDURE — G8417 CALC BMI ABV UP PARAM F/U: HCPCS | Performed by: UROLOGY

## 2021-01-28 PROCEDURE — G8427 DOCREV CUR MEDS BY ELIG CLIN: HCPCS | Performed by: UROLOGY

## 2021-01-28 NOTE — PROGRESS NOTES
79-year-old white male returns today for a follow-up. He underwent ultrasound-guided prostate biopsies on 8/20/2020 for a PSA of 5.6 NG/mL, that was rising. Fortunately his biopsies were negative for malignancy. His brother has a history of adenocarcinoma the prostate. Currently the patient has minimal voiding symptoms. The mild symptoms he have are not bothersome. His recent PSA dated 1/20/2021 is 4.8 NG/mL. Impression: 1. Elevated PSA  2. Family history of prostate carcinoma. Plan of care: #1 I gave him a requisition for PSA to be done every 6 months for the foreseeable future. 2.  I discussed prostate health specifically low fat diets and natural lycopene supplements. 3.  Return in 12 months for KAVYA and OV.

## 2021-03-25 ENCOUNTER — OFFICE VISIT (OUTPATIENT)
Dept: FAMILY MEDICINE CLINIC | Age: 56
End: 2021-03-25

## 2021-03-25 VITALS
SYSTOLIC BLOOD PRESSURE: 118 MMHG | RESPIRATION RATE: 10 BRPM | WEIGHT: 215.38 LBS | HEART RATE: 52 BPM | BODY MASS INDEX: 30.83 KG/M2 | HEIGHT: 70 IN | TEMPERATURE: 96.8 F | DIASTOLIC BLOOD PRESSURE: 72 MMHG

## 2021-03-25 DIAGNOSIS — Z23 IMMUNIZATION DUE: ICD-10-CM

## 2021-03-25 DIAGNOSIS — Z00.00 WELL ADULT EXAM: Primary | ICD-10-CM

## 2021-03-25 DIAGNOSIS — H33.021 RETINAL DETACHMENT OF RIGHT EYE WITH MULTIPLE RETINAL TEARS: ICD-10-CM

## 2021-03-25 DIAGNOSIS — R97.20 ELEVATED PSA: ICD-10-CM

## 2021-03-25 PROCEDURE — 90471 IMMUNIZATION ADMIN: CPT | Performed by: FAMILY MEDICINE

## 2021-03-25 PROCEDURE — 90750 HZV VACC RECOMBINANT IM: CPT | Performed by: FAMILY MEDICINE

## 2021-03-25 PROCEDURE — 99386 PREV VISIT NEW AGE 40-64: CPT | Performed by: FAMILY MEDICINE

## 2021-03-25 ASSESSMENT — PATIENT HEALTH QUESTIONNAIRE - PHQ9
SUM OF ALL RESPONSES TO PHQ QUESTIONS 1-9: 0
SUM OF ALL RESPONSES TO PHQ QUESTIONS 1-9: 0
SUM OF ALL RESPONSES TO PHQ9 QUESTIONS 1 & 2: 0
1. LITTLE INTEREST OR PLEASURE IN DOING THINGS: 0

## 2021-03-25 NOTE — PROGRESS NOTES
Immunizations Administered     Name Date Dose Route    Zoster Recombinant (Shingrix) 3/25/2021 0.5 mL Intramuscular    Site: Deltoid- Right    Lot: [id number]    NDC: 81795-630-11

## 2021-03-25 NOTE — PROGRESS NOTES
CARE VISIT    Jaquelin Sanders  YOB: 1965    Date of Service:  3/25/2021    Chief Complaint:   Jaquelin Sanders is a 63 y.o. 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  Authorizing Provider   VITAMIN D PO Take by mouth Yes Historical Provider, MD   sildenafil (REVATIO) 20 MG tablet Take 1-5 tablets by mouth as needed (ED) Yes Latoya Mcdonnell MD   aspirin 81 MG tablet Take 81 mg by mouth daily Yes Historical Provider, MD   Multiple Vitamins-Minerals (THERAPEUTIC MULTIVITAMIN-MINERALS) tablet Take 1 tablet by mouth daily Yes Historical Provider, MD       Past Medical History:   Diagnosis Date    Anterior subcapsular polar age-related cataract of right eye 2019    Detached retina, right 2019    H/O prostate biopsy 02/2020    julio     Past Surgical History:   Procedure Laterality Date    ABDOMEN SURGERY  within the last eight years    right and left inguinal hernial repairs    APPENDECTOMY  9-8-12    COLONOSCOPY  2016      due  2o19  and  family  hx  of  colon polyps    EYE SURGERY  07/2020    Cataract surgery- right eye (retinal detachment)     FRACTURE SURGERY  when patient was 3    left elbow    HERNIA REPAIR      KNEE ARTHROSCOPY  12/2017    SKIN BIOPSY  in 2000    cyst in arm    TONSILLECTOMY      patient thinks he had them out    VITRECTOMY Right 02/2019    laser    retinal detachement    VITRECTOMY Right 2/16/2019    VITRECTOMY 23 GAUGE, AIR FLUID AIR GAS EXCHANGE WITH 20% SF6, ENDOLASER 200MW 200MS 944 SPOTS performed by Tawanna Robison MD at [address] [place] [address] History   Problem Relation Age of Onset    Cancer Mother         colon cancer     Heart Disease Father         cabg   at  68     High Cholesterol Father     Cancer Brother         colon  cancer  another  brother  with prostate  cancer     Arthritis Neg Hx     Asthma Neg Hx     Birth Defects Neg Hx     Depression Neg Hx     Diabetes Neg Hx     Early Death Neg Hx     Hearing Loss Neg Hx     High Blood Pressure Neg Hx     Kidney Disease Neg Hx     Learning Disabilities Neg Hx     Mental Illness Neg Hx     Mental Retardation Neg Hx     Miscarriages / Stillbirths Neg Hx     Stroke Neg Hx     Substance Abuse Neg Hx     Vision Loss Neg Hx      Social History     Socioeconomic History    Marital status:      Spouse name: Not on file    Number of children: Not on file    Years of education: Not on file    Highest education level: Not on file   Occupational History    Not on file   Social Needs    Financial resource strain: Not on file    Food insecurity     Worry: Not on file     Inability: Not on file    Transportation needs     Medical: Not on file     Non-medical: Not on file   Tobacco Use    Smoking status: Never Smoker    Smokeless tobacco: Never Used   Substance and Sexual Activity    Alcohol use:  Yes     Alcohol/week: 6.0 standard drinks     Types: 6 Cans of beer per week     Comment: social drinker    Drug use: No    Sexual activity: Yes     Partners: Female   Lifestyle    Physical activity     Days per week: Not on file     Minutes per session: Not on file    Stress: Not on file   Relationships    Social connections     Talks on phone: Not on file     Gets together: Not on file     Attends Alevism service: Not on file     Active member of club or organization: Not on file     Attends meetings of clubs or organizations: Not on file     Relationship status: Not on file    Intimate partner violence     Fear of current or ex partner: Not on file     Emotionally abused: Not on file     Physically abused: Not on file     Forced sexual activity: Not on file   Other Topics Concern    Not on file   Social History Narrative    Not on file       Wt Readings from Last 3 Encounters:   03/25/21 215 lb 6 oz (97.7 kg)   01/28/21 220 lb (99.8 kg)   08/20/20 213 lb (96.6 kg)     BP Readings from Last 3 Encounters:   03/25/21 118/72   01/30/20 128/88   02/16/19 (!) 103/56        Vitals:    03/25/21 1053   BP: 118/72   Site: Right Upper Arm   Position: Sitting   Cuff Size: Medium Adult   Pulse: 52   Resp: 10   Temp: 96.8 °F (36 °C)   TempSrc: Infrared   Weight: 215 lb 6 oz (97.7 kg)   Height: 5' 10\" (1.778 m)     Body mass index is 30.9 kg/m². Physical Exam  Vitals signs and nursing note reviewed. Constitutional:       Appearance: He is well-developed. HENT:      Head: Normocephalic and atraumatic. Right Ear: External ear normal.      Left Ear: External ear normal.      Nose: Nose normal.   Eyes:      Conjunctiva/sclera: Conjunctivae normal.      Pupils: Pupils are equal, round, and reactive to light. Comments: Fundi nl   Neck:      Musculoskeletal: Normal range of motion and neck supple. Thyroid: No thyromegaly. Cardiovascular:      Rate and Rhythm: Normal rate and regular rhythm. Heart sounds: Normal heart sounds. Pulmonary:      Effort: Pulmonary effort is normal.      Breath sounds: Normal breath sounds. No wheezing or rales. Abdominal:      General: Bowel sounds are normal.      Palpations: Abdomen is soft. There is no mass. Tenderness: There is no abdominal tenderness. Musculoskeletal: Normal range of motion. Lymphadenopathy:      Cervical: No cervical adenopathy. Skin:     General: Skin is warm and dry. Findings: No rash.    Neurological:      Mental Status: He is alert and oriented to person, place, and time. Cranial Nerves: No cranial nerve deficit. Deep Tendon Reflexes: Reflexes are normal and symmetric. Lipid panel:   Lab Results   Component Value Date    LDLCALC 113 05/17/2017     Glucose:   Glucose (mg/dl)   Date Value   09/08/2012 122 (H)       Patient Care Team:  Bladimir Craft MD as PCP - General  Bladimir Craft MD as PCP - [facility] Empaneled Provider    Immunization History   Administered Date(s) Administered    COVID-19, Moderna, PF, 100mcg/0.5mL 01/25/2021, 02/23/2021    Influenza Virus Vaccine 10/18/2020    Tdap (Boostrix, Adacel) 10/09/2014    Zoster Recombinant (Shingrix) 03/25/2021       Health Maintenance Due   Topic Date Due    Hepatitis C screen  Never done    HIV screen  Never done    Diabetes screen  Never done        Assessment/Plan       Diagnosis Orders   1. Well adult exam     2. Immunization due  Zoster Subunit ([address])   3. Elevated PSA     4. 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 Weight loss        See in  One year     No follow-ups on file.

## 2021-03-30 NOTE — TELEPHONE ENCOUNTER
Date of last visit:  1/28/2021  Date of next visit:  Visit date not found    Requested Prescriptions     Pending Prescriptions Disp Refills    sildenafil (REVATIO) 20 MG tablet 90 tablet 3     Sig: Take 1-5 tablets by mouth as needed (ED)

## 2021-03-31 RX ORDER — SILDENAFIL CITRATE 20 MG/1
20-100 TABLET ORAL PRN
Qty: 90 TABLET | Refills: 3 | Status: SHIPPED | OUTPATIENT
Start: 2021-03-31 | End: 2021-09-28 | Stop reason: SDUPTHER

## 2021-06-24 ENCOUNTER — NURSE ONLY (OUTPATIENT)
Dept: FAMILY MEDICINE CLINIC | Age: 56
End: 2021-06-24

## 2021-06-24 DIAGNOSIS — Z23 NEED FOR SHINGLES VACCINE: Primary | ICD-10-CM

## 2021-06-24 PROCEDURE — 90471 IMMUNIZATION ADMIN: CPT | Performed by: EMERGENCY MEDICINE

## 2021-06-24 PROCEDURE — 90750 HZV VACC RECOMBINANT IM: CPT | Performed by: EMERGENCY MEDICINE

## 2021-06-24 NOTE — PROGRESS NOTES
Immunizations Administered     Name Date Dose Route    Zoster Recombinant (Shingrix) 6/24/2021 0.5 mL Intramuscular    Site: Deltoid- Right    Lot: [id number]    NDC: 98627-166-28

## 2021-09-28 NOTE — TELEPHONE ENCOUNTER
Date of last visit:  3/25/2021  Date of next visit:  Visit date not found    Requested Prescriptions     Pending Prescriptions Disp Refills    sildenafil (REVATIO) 20 MG tablet 90 tablet 3     Sig: Take 1-5 tablets by mouth as needed (ED)

## 2021-09-29 RX ORDER — SILDENAFIL CITRATE 20 MG/1
20-100 TABLET ORAL PRN
Qty: 90 TABLET | Refills: 2 | Status: SHIPPED | OUTPATIENT
Start: 2021-09-29 | End: 2022-02-17 | Stop reason: SDUPTHER

## 2022-02-01 DIAGNOSIS — R97.20 ELEVATED PSA: Primary | ICD-10-CM

## 2022-02-18 RX ORDER — SILDENAFIL CITRATE 20 MG/1
20-100 TABLET ORAL PRN
Qty: 90 TABLET | Refills: 2 | Status: SHIPPED | OUTPATIENT
Start: 2022-02-18 | End: 2022-06-27 | Stop reason: SDUPTHER

## 2022-02-18 NOTE — TELEPHONE ENCOUNTER
Date of last visit:  3/25/2021  Date of next visit:  3/31/2022    Requested Prescriptions     Pending Prescriptions Disp Refills    sildenafil (REVATIO) 20 MG tablet 90 tablet 2     Sig: Take 1-5 tablets by mouth as needed (ED)

## 2022-03-07 LAB — PROSTATE SPECIFIC ANTIGEN: 5.09 NG/ML

## 2022-03-10 ENCOUNTER — OFFICE VISIT (OUTPATIENT)
Dept: FAMILY MEDICINE CLINIC | Age: 57
End: 2022-03-10

## 2022-03-10 VITALS
WEIGHT: 212 LBS | TEMPERATURE: 96.7 F | DIASTOLIC BLOOD PRESSURE: 70 MMHG | RESPIRATION RATE: 18 BRPM | SYSTOLIC BLOOD PRESSURE: 116 MMHG | BODY MASS INDEX: 30.35 KG/M2 | HEIGHT: 70 IN | HEART RATE: 60 BPM

## 2022-03-10 DIAGNOSIS — R97.20 ELEVATED PSA: ICD-10-CM

## 2022-03-10 DIAGNOSIS — Z00.00 WELL ADULT EXAM: Primary | ICD-10-CM

## 2022-03-10 DIAGNOSIS — K63.5 POLYP OF COLON, UNSPECIFIED PART OF COLON, UNSPECIFIED TYPE: ICD-10-CM

## 2022-03-10 PROCEDURE — 99396 PREV VISIT EST AGE 40-64: CPT | Performed by: FAMILY MEDICINE

## 2022-03-10 RX ORDER — AMPICILLIN TRIHYDRATE 250 MG
1 CAPSULE ORAL DAILY
COMMUNITY

## 2022-03-10 SDOH — ECONOMIC STABILITY: FOOD INSECURITY: WITHIN THE PAST 12 MONTHS, YOU WORRIED THAT YOUR FOOD WOULD RUN OUT BEFORE YOU GOT MONEY TO BUY MORE.: NEVER TRUE

## 2022-03-10 SDOH — ECONOMIC STABILITY: FOOD INSECURITY: WITHIN THE PAST 12 MONTHS, THE FOOD YOU BOUGHT JUST DIDN'T LAST AND YOU DIDN'T HAVE MONEY TO GET MORE.: NEVER TRUE

## 2022-03-10 ASSESSMENT — ENCOUNTER SYMPTOMS
SORE THROAT: 0
CONSTIPATION: 0
COUGH: 0
BACK PAIN: 0
ABDOMINAL PAIN: 0
BLOOD IN STOOL: 0
SHORTNESS OF BREATH: 0
EYE PAIN: 0
TROUBLE SWALLOWING: 0
NAUSEA: 0
CHEST TIGHTNESS: 0

## 2022-03-10 ASSESSMENT — SOCIAL DETERMINANTS OF HEALTH (SDOH): HOW HARD IS IT FOR YOU TO PAY FOR THE VERY BASICS LIKE FOOD, HOUSING, MEDICAL CARE, AND HEATING?: NOT HARD AT ALL

## 2022-03-10 NOTE — PROGRESS NOTES
81 MG tablet Take 81 mg by mouth daily Yes Historical Provider, MD   Multiple Vitamins-Minerals (THERAPEUTIC MULTIVITAMIN-MINERALS) tablet Take 1 tablet by mouth daily Yes Historical Provider, MD       Past Medical History:   Diagnosis Date    Anterior subcapsular polar age-related cataract of right eye 2019    Detached retina, right 2019    H/O prostate biopsy 02/2020    julio     Past Surgical History:   Procedure Laterality Date    ABDOMEN SURGERY  within the last eight years    right and left inguinal hernial repairs    APPENDECTOMY  9-8-12    COLONOSCOPY  2016      due  2o19  and  family  hx  of  colon polyps    EYE SURGERY  07/2020    Cataract surgery- right eye (retinal detachment)     FRACTURE SURGERY  when patient was 3    left elbow    HERNIA REPAIR      KNEE ARTHROSCOPY  12/2017    SKIN BIOPSY  in 2000    cyst in arm    TONSILLECTOMY      patient thinks he had them out    VITRECTOMY Right 02/2019    laser    retinal detachement    VITRECTOMY Right 2/16/2019    VITRECTOMY 23 GAUGE, AIR FLUID AIR GAS EXCHANGE WITH 20% SF6, ENDOLASER 200MW 200MS 1 SPOTS performed by Anthony Fagan MD at [address] [place] [address] History   Problem Relation Age of Onset    Cancer Mother         colon cancer     Heart Disease Father         cabg   at  68     High Cholesterol Father     Cancer Brother         colon  cancer  another  brother  with prostate  cancer     Arthritis Neg Hx     Asthma Neg Hx     Birth Defects Neg Hx     Depression Neg Hx     Diabetes Neg Hx     Early Death Neg Hx     Hearing Loss Neg Hx     High Blood Pressure Neg Hx     Kidney Disease Neg Hx     Learning Disabilities Neg Hx     Mental Illness Neg Hx     Mental Retardation Neg Hx     Miscarriages / Stillbirths Neg Hx     Stroke Neg Hx     Substance Abuse Neg Hx     Vision Loss Neg Hx      Social History     Socioeconomic History    Marital status:      Spouse name: Not on file    Number of children: Not on file  Years of education: Not on file    Highest education level: Not on file   Occupational History    Not on file   Tobacco Use    Smoking status: Never Smoker    Smokeless tobacco: Never Used   Vaping Use    Vaping Use: Never used   Substance and Sexual Activity    Alcohol use: Yes     Alcohol/week: 6.0 standard drinks     Types: 6 Cans of beer per week     Comment: social drinker    Drug use: No    Sexual activity: Yes     Partners: Female   Other Topics Concern    Not on file   Social History Narrative    Not on file     Social Determinants of Health     Financial Resource Strain: Low Risk     Difficulty of Paying Living Expenses: Not hard at all   Food Insecurity: No Food Insecurity    Worried About [address] in the Last Year: Never true    [address] in the Last Year: Never true   Transportation Needs:     Lack of Transportation (Medical): Not on file    Lack of Transportation (Non-Medical):  Not on file   Physical Activity:     Days of Exercise per Week: Not on file    Minutes of Exercise per Session: Not on file   Stress:     Feeling of Stress : Not on file   Social Connections:     Frequency of Communication with Friends and Family: Not on file    Frequency of Social Gatherings with Friends and Family: Not on file    Attends Roman [employer] Services: Not on file    Active Member of [address] [place] [address] or Organizations: Not on file    Attends Club or Organization Meetings: Not on file    Marital Status: Not on file   Intimate Partner Violence:     Fear of Current or Ex-Partner: Not on file    Emotionally Abused: Not on file    Physically Abused: Not on file    Sexually Abused: Not on file   Housing Stability:     Unable to Pay for Housing in the Last Year: Not on file    Number of [place] in the Last Year: Not on file    Unstable Housing in the Last Year: Not on file       Wt Readings from Last 3 Encounters:   03/10/22 212 lb (96.2 kg)   03/25/21 215 lb 6 oz (97.7 kg)   01/28/21 220 lb (99.8 kg)     BP Readings from Last 3 Encounters:   03/10/22 116/70   03/25/21 118/72   01/30/20 128/88        Vitals:    03/10/22 1255   BP: 116/70   Site: Right Upper Arm   Position: Sitting   Cuff Size: Medium Adult   Pulse: 60   Resp: 18   Temp: 96.7 °F (35.9 °C)   TempSrc: Infrared   Weight: 212 lb (96.2 kg)   Height: 5' 10\" (1.778 m)     Body mass index is 30.42 kg/m². Physical Exam  Vitals and nursing note reviewed. Constitutional:       Appearance: He is well-developed. HENT:      Head: Normocephalic and atraumatic. Right Ear: External ear normal.      Left Ear: External ear normal.      Nose: Nose normal.   Eyes:      Conjunctiva/sclera: Conjunctivae normal.      Pupils: Pupils are equal, round, and reactive to light. Comments: Fundi nl   Neck:      Thyroid: No thyromegaly. Cardiovascular:      Rate and Rhythm: Normal rate and regular rhythm. Heart sounds: Normal heart sounds. Pulmonary:      Effort: Pulmonary effort is normal.      Breath sounds: Normal breath sounds. No wheezing or rales. Abdominal:      General: Bowel sounds are normal.      Palpations: Abdomen is soft. There is no mass. Tenderness: There is no abdominal tenderness. Musculoskeletal:         General: Normal range of motion. Cervical back: Normal range of motion and neck supple. Lymphadenopathy:      Cervical: No cervical adenopathy. Skin:     General: Skin is warm and dry. Findings: No rash. Neurological:      Mental Status: He is alert and oriented to person, place, and time. Cranial Nerves: No cranial nerve deficit. Deep Tendon Reflexes: Reflexes are normal and symmetric.          Lipid panel:   Lab Results   Component Value Date    LDLCALC 113 05/17/2017     Glucose:   Glucose (mg/dl)   Date Value   09/08/2012 122 (H)       Patient Care Team:  Lashonda Tsai MD as PCP - General  Lashonda Tsai MD as PCP [facility]    Immunization History Administered Date(s) Administered    COVID-19, Moderna, Primary or Immunocompromised, PF, 100mcg/0.5mL 01/25/2021, 02/23/2021    Influenza Virus Vaccine 10/18/2020    Tdap (Boostrix, Adacel) 10/09/2014    Zoster Recombinant (Shingrix) 03/25/2021, 06/24/2021       Health Maintenance Due   Topic Date Due    Hepatitis C screen  Never done    HIV screen  Never done    Diabetes screen  Never done    COVID-19 Vaccine (3 - Booster for Moderna series) 07/23/2021    Flu vaccine (1) 09/01/2021    PSA counseling  01/20/2022        Assessment/        Diagnosis Orders   1. Well adult exam     2. Elevated PSA     3. Polyp of colon, unspecified part of colon, unspecified type           Plan:   Current Outpatient Medications   Medication Sig Dispense Refill    Red Yeast Rice 600 MG CAPS Take 1 capsule by mouth daily      sildenafil (REVATIO) 20 MG tablet Take 1-5 tablets by mouth as needed (ED) 90 tablet 2    VITAMIN D PO Take by mouth      aspirin 81 MG tablet Take 81 mg by mouth daily      Multiple Vitamins-Minerals (THERAPEUTIC MULTIVITAMIN-MINERALS) tablet Take 1 tablet by mouth daily       No current facility-administered medications for this visit. No orders of the defined types were placed in this encounter.     .      See in one  Year

## 2022-03-11 ENCOUNTER — OFFICE VISIT (OUTPATIENT)
Dept: UROLOGY | Age: 57
End: 2022-03-11
Payer: COMMERCIAL

## 2022-03-11 VITALS
BODY MASS INDEX: 31.07 KG/M2 | DIASTOLIC BLOOD PRESSURE: 82 MMHG | HEIGHT: 70 IN | WEIGHT: 217 LBS | SYSTOLIC BLOOD PRESSURE: 128 MMHG

## 2022-03-11 DIAGNOSIS — N52.01 ERECTILE DYSFUNCTION DUE TO ARTERIAL INSUFFICIENCY: ICD-10-CM

## 2022-03-11 DIAGNOSIS — Z80.42 FAMILY HISTORY OF PROSTATE CANCER: ICD-10-CM

## 2022-03-11 DIAGNOSIS — R97.20 ELEVATED PSA: Primary | ICD-10-CM

## 2022-03-11 PROCEDURE — G8417 CALC BMI ABV UP PARAM F/U: HCPCS | Performed by: UROLOGY

## 2022-03-11 PROCEDURE — 1036F TOBACCO NON-USER: CPT | Performed by: UROLOGY

## 2022-03-11 PROCEDURE — 99213 OFFICE O/P EST LOW 20 MIN: CPT | Performed by: UROLOGY

## 2022-03-11 PROCEDURE — 3017F COLORECTAL CA SCREEN DOC REV: CPT | Performed by: UROLOGY

## 2022-03-11 PROCEDURE — G8427 DOCREV CUR MEDS BY ELIG CLIN: HCPCS | Performed by: UROLOGY

## 2022-03-11 PROCEDURE — G8484 FLU IMMUNIZE NO ADMIN: HCPCS | Performed by: UROLOGY

## 2022-03-11 NOTE — PROGRESS NOTES
TIGHT BP control. Yolanda Gregory MD   Urology Clinic Office visit      Patient:  Naty Madrid  YOB: 1965  Date: 3/11/2022    HISTORY OF PRESENT ILLNESS:   The patient is a 61 y.o. male who presents today for follow-up for the following problem(s):      1. Elevated PSA    2. Erectile dysfunction due to arterial insufficiency    3. Family history of prostate cancer           Overall the problem(s) : are worsening. Associated Symptoms: No dysuria, gross hematuria. Pain Severity:      Summary of old records: He underwent ultrasound-guided prostate biopsies on 8/20/2020 for a PSA of 5.6 NG/mL, that was rising. Fortunately his biopsies were negative for malignancy. His brother has a history of adenocarcinoma the prostate. Currently the patient has minimal voiding symptoms. The mild symptoms he have are not bothersome. His recent PSA dated 1/20/2021 is 4.8 NG/mL. Additional History: Onset years  Severity is described as mild. The course of symptoms over time is chronic. Alleviating factors: none  Worsening factors: none  Lower urinary tract symptoms: minimal  Sildenafil for ED    5.09 3/2022    I independently reviewed and verified the images and reports from:    No results found. Last several PSA's:  Lab Results   Component Value Date    PSA 5.09 03/07/2022    PSA 4.8 01/20/2021    PSA 4.8 07/17/2020       Last total testosterone:  No results found for: TESTOSTERONE    Urinalysis today:  No results found for this visit on 03/11/22.     Last BUN and creatinine:  Lab Results   Component Value Date    BUN 16 09/08/2012     Lab Results   Component Value Date    CREATININE 1.0 09/08/2012       Imaging Reviewed during this Office Visit:   (results were independently reviewed by physician and radiology report verified)    PAST MEDICAL, FAMILY AND SOCIAL HISTORY UPDATE:  Past Medical History:   Diagnosis Date    Anterior subcapsular polar age-related cataract of right eye 2019    Detached retina, right 2019    H/O prostate biopsy 02/2020    julio     Past Surgical History:   Procedure Laterality Date    ABDOMEN SURGERY  within the last eight years    right and left inguinal hernial repairs    APPENDECTOMY  9-8-12    COLONOSCOPY  2016      due  2o19  and  family  hx  of  colon polyps    EYE SURGERY  07/2020    Cataract surgery- right eye (retinal detachment)     FRACTURE SURGERY  when patient was 3    left elbow    HERNIA REPAIR      KNEE ARTHROSCOPY  12/2017    SKIN BIOPSY  in 2000    cyst in arm    TONSILLECTOMY      patient thinks he had them out    VITRECTOMY Right 02/2019    laser    retinal detachement    VITRECTOMY Right 2/16/2019    VITRECTOMY 23 GAUGE, AIR FLUID AIR GAS EXCHANGE WITH 20% SF6, ENDOLASER 200MW 200MS 1 SPOTS performed by Aroldo Cervantes MD at [address] [place] [address] History   Problem Relation Age of Onset    Cancer Mother         colon cancer     Heart Disease Father         cabg   at  68     High Cholesterol Father     Cancer Brother         colon  cancer  another  brother  with prostate  cancer     Arthritis Neg Hx     Asthma Neg Hx     Birth Defects Neg Hx     Depression Neg Hx     Diabetes Neg Hx     Early Death Neg Hx     Hearing Loss Neg Hx     High Blood Pressure Neg Hx     Kidney Disease Neg Hx     Learning Disabilities Neg Hx     Mental Illness Neg Hx     Mental Retardation Neg Hx     Miscarriages / Stillbirths Neg Hx     Stroke Neg Hx     Substance Abuse Neg Hx     Vision Loss Neg Hx      Outpatient Medications Marked as Taking for the 3/11/22 encounter (Office Visit) with Dylan Che MD   Medication Sig Dispense Refill    Red Yeast Rice 600 MG CAPS Take 1 capsule by mouth daily      sildenafil (REVATIO) 20 MG tablet Take 1-5 tablets by mouth as needed (ED) 90 tablet 2    VITAMIN D PO Take by mouth      aspirin 81 MG tablet Take 81 mg by mouth daily      Multiple Vitamins-Minerals (THERAPEUTIC MULTIVITAMIN-MINERALS) tablet Take 1 tablet by mouth daily         Latex  Social History     Tobacco Use   Smoking Status Never Smoker   Smokeless Tobacco Never Used       Social History     Substance and Sexual Activity   Alcohol Use Yes    Alcohol/week: 6.0 standard drinks    Types: 6 Cans of beer per week    Comment: social drinker       REVIEW OF SYSTEMS:  Constitutional: negative  Eyes: negative  Respiratory: negative  Cardiovascular: negative  Gastrointestinal: negative  Musculoskeletal: negative  Genitourinary: negative except for what is in HPI  Skin: negative   Neurological: negative  Hematological/Lymphatic: negative  Psychological: negative    Physical Exam:      Vitals:    03/11/22 1059   BP: 128/82     Patient is a 61 y.o. male in no acute distress and alert and oriented to person, place and time. NAD, A/o  Non labored respiration  Normal peripheral pulses  Skin- warm and dry  Psych- normal mood and affect      Assessment and Plan      1. Elevated PSA    2. Erectile dysfunction due to arterial insufficiency    3. Family history of prostate cancer           Follow up 1 year with PSA  ED: sildenafil    Prescriptions Ordered:  No orders of the defined types were placed in this encounter.      Orders Placed:  Orders Placed This Encounter   Procedures    PSA, Diagnostic     Standing Status:   Future     Standing Expiration Date:   3/11/2023          MD Regine Jordan M.D, M.D  [facility]

## 2022-06-27 NOTE — TELEPHONE ENCOUNTER
Date of last visit:  3/10/2022  Date of next visit:  Visit date not found    Requested Prescriptions     Pending Prescriptions Disp Refills    sildenafil (REVATIO) 20 MG tablet 90 tablet 2     Sig: Take 1-5 tablets by mouth as needed (ED)

## 2022-06-28 RX ORDER — SILDENAFIL CITRATE 20 MG/1
20-100 TABLET ORAL PRN
Qty: 90 TABLET | Refills: 2 | Status: SHIPPED | OUTPATIENT
Start: 2022-06-28

## 2022-09-24 NOTE — TELEPHONE ENCOUNTER
Esther So called requesting a refill on the following medications:  Requested Prescriptions     Pending Prescriptions Disp Refills    sildenafil (REVATIO) 20 MG tablet 30 tablet 3     Sig: Take 1-5 tablets by mouth as needed (ED)     Pharmacy verified:  [facility] on [address] [place] [address]      Date of last visit: 11/9/18  Date of next visit (if applicable): 45/74/7550 [facility]    Medicine Progress Note - Hospitalist Service    Date of Admission:  9/21/2022    Assessment & Plan          Niru Durham is a 89 year old female admitted on 9/21/2022. She presents with a fall.     Principal Problem:    NSTEMI (non-ST elevated myocardial infarction) (H)  Assessment: Present on admission.  Troponin significantly elevated at 6900, increasing with follow-up to 7200, then trending down.  Patient does not have any chest pain or shortness of breath. echocardiogram shows wall motion abnormalities that could be either LAD distribution or stress cardiomyopathy.  It is the 1 month anniversary of her son's death and she is quite tearful, stress cardiomyopathy could certainly be playing a role.  However, given her history of coronary artery disease in the past we cannot ignore the idea of this being a non-ST elevation MI.  I discussed with the patient and her daughter that we do not have ability to transfer her to a Cath Lab, as all hospitals in the region are not excepting transfers.  They understand this and are in agreement for medical management at this time.  We discussed CODE STATUS, and the patient has elected to be DO NOT RESUSCITATE.  Plan:   Continue metoprolol XL  Start lisinopril 2.5 mg daily  High intensity atorvastatin 80 mg daily  Dual antiplatelet therapy with baby aspirin and Brilinta. She got 48 hour of lovenox  Plan for discharge to SNF.  Plan outpatient cardiology follow-up    Active Problems:    Coronary atherosclerosis of native coronary artery  Assessment: 2014 non-ST elevation MI with JANE to LAD and RCA      Hyperlipidemia  Assessment: chronic  Plan: change to atorvastatin from pravachol      Hypertension  Assessment: chronic  Plan: monitor      Urinary tract infection-E coli.   Assessment: present on admission   Plan: empiric ceftriaxone     Acute on chronic systolic heart failure   Assessment: EF in 2014 55%, now 30-35% with wall motion  "abnormalities   Plan: lisinopril, Toprol, monitor volume status       Diet: Regular Diet Adult    DVT Prophylaxis: SCD  Scott Catheter: Not present  Central Lines: None  Cardiac Monitoring: None  Code Status: No CPR- Do NOT Intubate      Disposition Plan    SNF      The patient's care was discussed with the Patient.    Yoselyn Zuñiga DO  Hospitalist Service  [facility]  Securely message with the Vocera Web Console (learn more here)  Text page via [employer] Paging/Directory         Clinically Significant Risk Factors Present on Admission               # Obesity: Estimated body mass index is 36.51 kg/m  as calculated from the following:    Height as of this encounter: 1.575 m (5' 2\").    Weight as of this encounter: 90.5 kg (199 lb 9.6 oz).        __________________________________________________________________    Interval History   Doing well today.  Ate a good breakfast.  No chest pain or shortness of breath.  Working on getting a bed at [facility] where her daughter works.  No leg swelling.    Data reviewed today: I reviewed all medications, new labs and imaging results over the last 24 hours. I personally reviewed no images or EKG's today.    Physical Exam   Vital Signs: Temp: 98.5  F (36.9  C) Temp src: Tympanic BP: 124/72 Pulse: 69   Resp: 18 SpO2: 95 % O2 Device: Nasal cannula Oxygen Delivery: 2 LPM  Weight: 199 lbs 9.6 oz  General Appearance: Awake alert and oriented.  No acute distress.    Respiratory: Clear to auscultation bilaterally.  No wheezing rhonchi or rales  Cardiovascular: Regular rate.  No murmur mild bilateral nonpitting edema  GI: Abdomen soft, nontender, nondistended  Skin: Warm and dry  Other: Appropriate affect and memory recall    Data   Recent Labs   Lab 09/23/22  0551 09/22/22  0734 09/21/22  0828   WBC 7.9 8.3 7.3   HGB 11.7 12.5 12.2   MCV 90 89 88    269 246    137 139   POTASSIUM 3.9 3.9 3.9   CHLORIDE 102 100 103   CO2 29 30 27   BUN 21 19 19   CR " 0.78 0.80 0.69   ANIONGAP 7 7 9   ОЛЬГА 9.3 9.6 10.1   * 147* 126*     No results found for this or any previous visit (from the past 24 hour(s)).

## 2022-12-19 NOTE — TELEPHONE ENCOUNTER
The pharmacy is  requesting a refill of the below medication which has been pended for you:     Requested Prescriptions     Pending Prescriptions Disp Refills    sildenafil (REVATIO) 20 MG tablet 90 tablet 1     Sig: Take 1-5 tablets by mouth as needed (ED)       Last Appointment Date: 3/10/2022  Next Appointment Date: Visit date not found    Allergies   Allergen Reactions    Latex Rash

## 2022-12-22 RX ORDER — SILDENAFIL CITRATE 20 MG/1
20-100 TABLET ORAL PRN
Qty: 90 TABLET | Refills: 1 | Status: SHIPPED | OUTPATIENT
Start: 2022-12-22

## 2023-03-06 LAB — PROSTATE SPECIFIC ANTIGEN: 4.72 NG/ML

## 2023-03-10 ENCOUNTER — OFFICE VISIT (OUTPATIENT)
Dept: UROLOGY | Age: 58
End: 2023-03-10
Payer: COMMERCIAL

## 2023-03-10 VITALS
BODY MASS INDEX: 31.64 KG/M2 | DIASTOLIC BLOOD PRESSURE: 82 MMHG | SYSTOLIC BLOOD PRESSURE: 130 MMHG | WEIGHT: 221 LBS | HEIGHT: 70 IN

## 2023-03-10 DIAGNOSIS — R97.20 ELEVATED PSA: Primary | ICD-10-CM

## 2023-03-10 DIAGNOSIS — Z80.42 FAMILY HISTORY OF PROSTATE CANCER: ICD-10-CM

## 2023-03-10 DIAGNOSIS — N52.01 ERECTILE DYSFUNCTION DUE TO ARTERIAL INSUFFICIENCY: ICD-10-CM

## 2023-03-10 PROCEDURE — 99213 OFFICE O/P EST LOW 20 MIN: CPT | Performed by: UROLOGY

## 2023-03-10 PROCEDURE — G8484 FLU IMMUNIZE NO ADMIN: HCPCS | Performed by: UROLOGY

## 2023-03-10 PROCEDURE — 3017F COLORECTAL CA SCREEN DOC REV: CPT | Performed by: UROLOGY

## 2023-03-10 PROCEDURE — 1036F TOBACCO NON-USER: CPT | Performed by: UROLOGY

## 2023-03-10 PROCEDURE — G8417 CALC BMI ABV UP PARAM F/U: HCPCS | Performed by: UROLOGY

## 2023-03-10 PROCEDURE — G8427 DOCREV CUR MEDS BY ELIG CLIN: HCPCS | Performed by: UROLOGY

## 2023-03-10 NOTE — PROGRESS NOTES
Priyanka Mueller MD   Urology Clinic Office visit      Patient:  Rupinder Witt  YOB: 1965  Date: 3/10/2023    HISTORY OF PRESENT ILLNESS:   The patient is a 61 y.o. male who presents today for follow-up for the following problem(s):      1. Elevated PSA    2. Erectile dysfunction due to arterial insufficiency    3. Family history of prostate cancer           Overall the problem(s) : are unchanged  Associated Symptoms: No dysuria, gross hematuria. Pain Severity:      Summary of old records: He underwent ultrasound-guided prostate biopsies on 8/20/2020 for a PSA of 5.6 NG/mL, that was rising. Fortunately his biopsies were negative for malignancy. His brother has a history of adenocarcinoma the prostate. Currently the patient has minimal voiding symptoms. The mild symptoms he have are not bothersome. His recent PSA dated 1/20/2021 is 4.8 NG/mL. Additional History: Onset years  Severity is described as mild. The course of symptoms over time is chronic. Alleviating factors: none  Worsening factors: none  Lower urinary tract symptoms: minimal; IPSS 7, QOL 1  Sildenafil for ED    5.09 3/2022  4.72 3/2023        Last several PSA's:  Lab Results   Component Value Date    PSA 4.72 03/06/2023    PSA 5.09 03/07/2022    PSA 4.8 01/20/2021       Last total testosterone:  No results found for: TESTOSTERONE    Urinalysis today:  No results found for this visit on 03/10/23.     Last BUN and creatinine:  Lab Results   Component Value Date    BUN 16 09/08/2012     Lab Results   Component Value Date    CREATININE 1.0 09/08/2012       Imaging Reviewed during this Office Visit:   (results were independently reviewed by physician and radiology report verified)    PAST MEDICAL, FAMILY AND SOCIAL HISTORY UPDATE:  Past Medical History:   Diagnosis Date    Anterior subcapsular polar age-related cataract of right eye 2019    Detached retina, right 2019    H/O prostate biopsy 02/2020    julio Sanders Surgical History:   Procedure Laterality Date    ABDOMEN SURGERY  within the last eight years    right and left inguinal hernial repairs    APPENDECTOMY  9-8-12    COLONOSCOPY  2016      due  2o19  and  family  hx  of  colon polyps    EYE SURGERY  07/2020    Cataract surgery- right eye (retinal detachment)     FRACTURE SURGERY  when patient was 3    left elbow    HERNIA REPAIR      KNEE ARTHROSCOPY  12/2017    SKIN BIOPSY  in 2000    cyst in arm    TONSILLECTOMY      patient thinks he had them out    VITRECTOMY Right 02/2019    laser    retinal detachement    VITRECTOMY Right 2/16/2019    VITRECTOMY 23 GAUGE, AIR FLUID AIR GAS EXCHANGE WITH 20% SF6, ENDOLASER 200MW 200MS 944 SPOTS performed by Carolina Brady MD at [address] [place] [address] History   Problem Relation Age of Onset    Cancer Mother         colon cancer     Heart Disease Father         cabg   at  68     High Cholesterol Father     Cancer Brother         colon  cancer  another  brother  with prostate  cancer     Arthritis Neg Hx     Asthma Neg Hx     Birth Defects Neg Hx     Depression Neg Hx     Diabetes Neg Hx     Early Death Neg Hx     Hearing Loss Neg Hx     High Blood Pressure Neg Hx     Kidney Disease Neg Hx     Learning Disabilities Neg Hx     Mental Illness Neg Hx     Mental Retardation Neg Hx     Miscarriages / Stillbirths Neg Hx     Stroke Neg Hx     Substance Abuse Neg Hx     Vision Loss Neg Hx      Outpatient Medications Marked as Taking for the 3/10/23 encounter (Office Visit) with Wicho Ceja MD   Medication Sig Dispense Refill    sildenafil (REVATIO) 20 MG tablet Take 1-5 tablets by mouth as needed (ED) 90 tablet 1    Red Yeast Rice 600 MG CAPS Take 1 capsule by mouth daily      VITAMIN D PO Take by mouth      aspirin 81 MG tablet Take 81 mg by mouth daily      Multiple Vitamins-Minerals (THERAPEUTIC MULTIVITAMIN-MINERALS) tablet Take 1 tablet by mouth daily         Latex  Social History     Tobacco Use   Smoking Status Never   Smokeless Tobacco Never       Social History     Substance and Sexual Activity   Alcohol Use Yes    Alcohol/week: 6.0 standard drinks    Types: 6 Cans of beer per week    Comment: social drinker       REVIEW OF SYSTEMS:  Constitutional: negative  Eyes: negative  Respiratory: negative  Cardiovascular: negative  Gastrointestinal: negative  Musculoskeletal: negative  Genitourinary: negative except for what is in HPI  Skin: negative   Neurological: negative  Hematological/Lymphatic: negative  Psychological: negative    Physical Exam:      Vitals:    03/10/23 0755   BP: 130/82     Patient is a 61 y.o. male in no acute distress and alert and oriented to person, place and time. NAD, A/o  Non labored respiration  Normal peripheral pulses  Skin- warm and dry  Psych- normal mood and affect      Assessment and Plan      1. Elevated PSA    2. Erectile dysfunction due to arterial insufficiency    3. Family history of prostate cancer           Follow up 1 year with PSA  ED: sildenafil        Prescriptions Ordered:  No orders of the defined types were placed in this encounter.      Orders Placed:  Orders Placed This Encounter   Procedures    PSA, Diagnostic     Standing Status:   Future     Standing Expiration Date:   3/9/2024            Wes London M.D, MD Ismael Denson M.D  [facility]

## 2023-04-20 NOTE — TELEPHONE ENCOUNTER
Date of last visit:  3/10/2022  Date of next visit:  Visit date not found    Requested Prescriptions     Pending Prescriptions Disp Refills    sildenafil (REVATIO) 20 MG tablet 90 tablet 1     Sig: Take 1-5 tablets by mouth as needed (ED)

## 2023-04-21 RX ORDER — SILDENAFIL CITRATE 20 MG/1
20-100 TABLET ORAL PRN
Qty: 90 TABLET | Refills: 0 | Status: SHIPPED | OUTPATIENT
Start: 2023-04-21

## 2023-04-21 NOTE — TELEPHONE ENCOUNTER
Need wellness appt  and should get lab and unsure if will do just at [facility]     please call to do wellness appt

## 2023-06-01 ENCOUNTER — OFFICE VISIT (OUTPATIENT)
Dept: FAMILY MEDICINE CLINIC | Age: 58
End: 2023-06-01

## 2023-06-01 VITALS
RESPIRATION RATE: 16 BRPM | DIASTOLIC BLOOD PRESSURE: 82 MMHG | BODY MASS INDEX: 31.35 KG/M2 | SYSTOLIC BLOOD PRESSURE: 116 MMHG | WEIGHT: 219 LBS | HEART RATE: 72 BPM | HEIGHT: 70 IN

## 2023-06-01 DIAGNOSIS — Z00.00 WELL ADULT EXAM: Primary | ICD-10-CM

## 2023-06-01 SDOH — ECONOMIC STABILITY: FOOD INSECURITY: WITHIN THE PAST 12 MONTHS, YOU WORRIED THAT YOUR FOOD WOULD RUN OUT BEFORE YOU GOT MONEY TO BUY MORE.: NEVER TRUE

## 2023-06-01 SDOH — ECONOMIC STABILITY: HOUSING INSECURITY
IN THE LAST 12 MONTHS, WAS THERE A TIME WHEN YOU DID NOT HAVE A STEADY PLACE TO SLEEP OR SLEPT IN A SHELTER (INCLUDING NOW)?: NO

## 2023-06-01 SDOH — ECONOMIC STABILITY: INCOME INSECURITY: HOW HARD IS IT FOR YOU TO PAY FOR THE VERY BASICS LIKE FOOD, HOUSING, MEDICAL CARE, AND HEATING?: NOT HARD AT ALL

## 2023-06-01 SDOH — ECONOMIC STABILITY: FOOD INSECURITY: WITHIN THE PAST 12 MONTHS, THE FOOD YOU BOUGHT JUST DIDN'T LAST AND YOU DIDN'T HAVE MONEY TO GET MORE.: NEVER TRUE

## 2023-06-01 ASSESSMENT — ENCOUNTER SYMPTOMS
NAUSEA: 0
ABDOMINAL PAIN: 0
EYE PAIN: 0
BACK PAIN: 0
SORE THROAT: 0
SHORTNESS OF BREATH: 0
TROUBLE SWALLOWING: 0
CONSTIPATION: 0
CHEST TIGHTNESS: 0
COUGH: 0
BLOOD IN STOOL: 0

## 2023-06-01 ASSESSMENT — PATIENT HEALTH QUESTIONNAIRE - PHQ9
SUM OF ALL RESPONSES TO PHQ QUESTIONS 1-9: 0
2. FEELING DOWN, DEPRESSED OR HOPELESS: 0
SUM OF ALL RESPONSES TO PHQ9 QUESTIONS 1 & 2: 0
SUM OF ALL RESPONSES TO PHQ QUESTIONS 1-9: 0
1. LITTLE INTEREST OR PLEASURE IN DOING THINGS: 0

## 2023-06-01 NOTE — PROGRESS NOTES
CARE VISIT    Luis Antonio Michael  YOB: 1965    Date of Service:  2023    Chief Complaint:   Luis Antonio Michael is a 61 y.o. 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 HENT:  Negative for congestion, ear pain, postnasal drip, sore throat and trouble swallowing. Eyes:  Negative for pain. Respiratory:  Negative for cough, chest tightness and shortness of breath. Cardiovascular:  Negative for chest pain, palpitations and leg swelling. Gastrointestinal:  Negative for abdominal pain, blood in stool, constipation and nausea. Genitourinary:  Negative for difficulty urinating, frequency and urgency. Musculoskeletal:  Negative for arthralgias, back pain, joint swelling and neck stiffness. Skin:  Negative for rash. Neurological:  Negative for dizziness, weakness and headaches. Hematological:  Negative for adenopathy. Does not bruise/bleed easily. Psychiatric/Behavioral:  Negative for behavioral problems, dysphoric mood and sleep disturbance. Allergies   Allergen Reactions    Latex Rash     Prior to Visit Medications    Medication Sig Taking?  Authorizing Provider   sildenafil (REVATIO) 20 MG tablet Take 1-5

## 2023-07-17 NOTE — TELEPHONE ENCOUNTER
The pharmacy is requesting a refill of the below medication which has been pended for you:     Requested Prescriptions     Pending Prescriptions Disp Refills    sildenafil (REVATIO) 20 MG tablet 90 tablet 0     Sig: Take 1-5 tablets by mouth as needed (ED)       Last Appointment Date: 6/1/2023  Next Appointment Date: 6/7/2024    Allergies   Allergen Reactions    Latex Rash

## 2023-07-18 RX ORDER — SILDENAFIL CITRATE 20 MG/1
20-100 TABLET ORAL PRN
Qty: 90 TABLET | Refills: 0 | Status: SHIPPED | OUTPATIENT
Start: 2023-07-18

## 2023-10-21 DIAGNOSIS — N52.9 ERECTILE DYSFUNCTION, UNSPECIFIED ERECTILE DYSFUNCTION TYPE: Primary | ICD-10-CM

## 2023-10-23 NOTE — TELEPHONE ENCOUNTER
Nelsy Zhu called requesting a refill of the below medication which has been pended for you:     Requested Prescriptions     Pending Prescriptions Disp Refills    sildenafil (REVATIO) 20 MG tablet 90 tablet 0     Sig: Take 1-5 tablets by mouth as needed (ED)       Last Appointment Date: 6/1/2023  Next Appointment Date: 6/7/2024    Allergies   Allergen Reactions    Latex Rash

## 2023-10-24 RX ORDER — SILDENAFIL CITRATE 20 MG/1
20-100 TABLET ORAL PRN
Qty: 90 TABLET | Refills: 0 | Status: CANCELLED | OUTPATIENT
Start: 2023-10-24

## 2023-10-24 NOTE — TELEPHONE ENCOUNTER
Nelsy Zhu informed by Phone. He stated he was ok with trying Sildenafil 50mg tablet- please send to [facility].

## 2023-10-25 RX ORDER — SILDENAFIL 50 MG/1
50 TABLET, FILM COATED ORAL PRN
Qty: 30 TABLET | Refills: 3 | Status: SHIPPED | OUTPATIENT
Start: 2023-10-25

## 2024-04-03 DIAGNOSIS — R97.20 ELEVATED PSA: Primary | ICD-10-CM

## 2024-04-03 LAB — PROSTATE SPECIFIC ANTIGEN: 5.43 NG/ML

## 2024-04-04 ENCOUNTER — OFFICE VISIT (OUTPATIENT)
Dept: UROLOGY | Age: 59
End: 2024-04-04
Payer: COMMERCIAL

## 2024-04-04 VITALS
BODY MASS INDEX: 32.35 KG/M2 | HEIGHT: 70 IN | SYSTOLIC BLOOD PRESSURE: 126 MMHG | HEART RATE: 70 BPM | DIASTOLIC BLOOD PRESSURE: 78 MMHG | WEIGHT: 226 LBS | RESPIRATION RATE: 16 BRPM

## 2024-04-04 DIAGNOSIS — R97.20 ELEVATED PSA: Primary | ICD-10-CM

## 2024-04-04 DIAGNOSIS — N52.01 ERECTILE DYSFUNCTION DUE TO ARTERIAL INSUFFICIENCY: ICD-10-CM

## 2024-04-04 DIAGNOSIS — Z80.42 FAMILY HISTORY OF PROSTATE CANCER: ICD-10-CM

## 2024-04-04 PROCEDURE — 1036F TOBACCO NON-USER: CPT | Performed by: UROLOGY

## 2024-04-04 PROCEDURE — G8427 DOCREV CUR MEDS BY ELIG CLIN: HCPCS | Performed by: UROLOGY

## 2024-04-04 PROCEDURE — 99214 OFFICE O/P EST MOD 30 MIN: CPT | Performed by: UROLOGY

## 2024-04-04 PROCEDURE — 3017F COLORECTAL CA SCREEN DOC REV: CPT | Performed by: UROLOGY

## 2024-04-04 PROCEDURE — G8417 CALC BMI ABV UP PARAM F/U: HCPCS | Performed by: UROLOGY

## 2024-04-04 NOTE — PROGRESS NOTES
Chin Rivera MD   Urology Clinic Office visit      Patient:  Toño Espinal  YOB: 1965  Date: 4/4/2024    HISTORY OF PRESENT ILLNESS:   The patient is a 58 y.o. male who presents today for follow-up for the following problem(s):      1. Elevated PSA    2. Erectile dysfunction due to arterial insufficiency    3. Family history of prostate cancer             Overall the problem(s) : are unchanged  Associated Symptoms: No dysuria, gross hematuria.  Pain Severity:      Summary of old records: He underwent ultrasound-guided prostate biopsies on 8/20/2020 for a PSA of 5.6 NG/mL, that was rising.  Fortunately his biopsies were negative for malignancy.  His brother has a history of adenocarcinoma the prostate.  Currently the patient has minimal voiding symptoms.  The mild symptoms he have are not bothersome.  His recent PSA dated 1/20/2021 is 4.8 NG/mL.    Additional History:   Onset years  Severity is described as mild.   The course of symptoms over time is chronic.  Alleviating factors: none  Worsening factors: none  Lower urinary tract symptoms: minimal; IPSS 7, QOL 1  Sildenafil for ED    5.09 3/2022  4.72 3/2023    2020 PSA 5.6  Had sex 3 days before most recent blood test PSA 5.43      Last several PSA's:  Lab Results   Component Value Date    PSA 5.43 04/03/2024    PSA 4.72 03/06/2023    PSA 5.09 03/07/2022       Last total testosterone:  No results found for: \"TESTOSTERONE\"    Urinalysis today:  No results found for this visit on 04/04/24.    Last BUN and creatinine:  Lab Results   Component Value Date    BUN 16 09/08/2012     Lab Results   Component Value Date    CREATININE 1.0 09/08/2012       Imaging Reviewed during this Office Visit:   (results were independently reviewed by physician and radiology report verified)    PAST MEDICAL, FAMILY AND SOCIAL HISTORY UPDATE:  Past Medical History:   Diagnosis Date    Anterior subcapsular polar age-related cataract of right eye 2019    Detached

## 2024-04-05 ENCOUNTER — TELEPHONE (OUTPATIENT)
Dept: FAMILY MEDICINE CLINIC | Age: 59
End: 2024-04-05

## 2024-04-05 NOTE — TELEPHONE ENCOUNTER
----- Message from Anastacio Boyce MD sent at 4/5/2024  5:50 AM EDT -----  Call as psa about  same as slight up  but similar . Urology should see

## 2024-07-17 ASSESSMENT — PATIENT HEALTH QUESTIONNAIRE - PHQ9
SUM OF ALL RESPONSES TO PHQ9 QUESTIONS 1 & 2: 0
2. FEELING DOWN, DEPRESSED OR HOPELESS: NOT AT ALL
1. LITTLE INTEREST OR PLEASURE IN DOING THINGS: NOT AT ALL
SUM OF ALL RESPONSES TO PHQ QUESTIONS 1-9: 0
SUM OF ALL RESPONSES TO PHQ9 QUESTIONS 1 & 2: 0
SUM OF ALL RESPONSES TO PHQ QUESTIONS 1-9: 0
SUM OF ALL RESPONSES TO PHQ QUESTIONS 1-9: 0
1. LITTLE INTEREST OR PLEASURE IN DOING THINGS: NOT AT ALL
2. FEELING DOWN, DEPRESSED OR HOPELESS: NOT AT ALL
SUM OF ALL RESPONSES TO PHQ QUESTIONS 1-9: 0

## 2024-07-18 ENCOUNTER — OFFICE VISIT (OUTPATIENT)
Dept: FAMILY MEDICINE CLINIC | Age: 59
End: 2024-07-18

## 2024-07-18 VITALS
RESPIRATION RATE: 16 BRPM | HEIGHT: 70 IN | WEIGHT: 222 LBS | HEART RATE: 71 BPM | BODY MASS INDEX: 31.78 KG/M2 | DIASTOLIC BLOOD PRESSURE: 70 MMHG | SYSTOLIC BLOOD PRESSURE: 138 MMHG

## 2024-07-18 DIAGNOSIS — E78.01 FAMILIAL HYPERCHOLESTEROLEMIA: ICD-10-CM

## 2024-07-18 DIAGNOSIS — K63.5 POLYP OF COLON, UNSPECIFIED PART OF COLON, UNSPECIFIED TYPE: ICD-10-CM

## 2024-07-18 DIAGNOSIS — Z00.00 WELL ADULT EXAM: Primary | ICD-10-CM

## 2024-07-18 ASSESSMENT — ENCOUNTER SYMPTOMS
ABDOMINAL PAIN: 0
CONSTIPATION: 0
CHEST TIGHTNESS: 0
NAUSEA: 0
BLOOD IN STOOL: 0
TROUBLE SWALLOWING: 0
BACK PAIN: 0
EYE PAIN: 0
COUGH: 0
SHORTNESS OF BREATH: 0
SORE THROAT: 0

## 2024-07-18 NOTE — PROGRESS NOTES
Vaccine (3 - 2023-24 season) 09/01/2023        Assessment/    ICD-10-CM    1. Well adult exam  Z00.00       2. Familial hypercholesterolemia  E78.01       3. Polyp of colon, unspecified part of colon, unspecified type  K63.5                Plan:      Current Outpatient Medications   Medication Sig Dispense Refill    sildenafil (VIAGRA) 50 MG tablet Take 1 tablet by mouth as needed for Erectile Dysfunction 30 tablet 3    sildenafil (REVATIO) 20 MG tablet Take 1-5 tablets by mouth as needed (ED) 90 tablet 0    Red Yeast Rice 600 MG CAPS Take 1 capsule by mouth daily      VITAMIN D PO Take by mouth      Multiple Vitamins-Minerals (THERAPEUTIC MULTIVITAMIN-MINERALS) tablet Take 1 tablet by mouth daily       No current facility-administered medications for this visit.      There are no diagnoses linked to this encounter.     No follow-ups on file.      Orders Placed This Encounter   Procedures    Comprehensive Metabolic Panel     Standing Status:   Future     Standing Expiration Date:   7/18/2025    CBC with Auto Differential     Standing Status:   Future     Standing Expiration Date:   7/18/2025    TSH with Reflex     Standing Status:   Future     Standing Expiration Date:   7/18/2025          Need  weight loss

## 2024-11-12 DIAGNOSIS — N52.9 ERECTILE DYSFUNCTION, UNSPECIFIED ERECTILE DYSFUNCTION TYPE: ICD-10-CM

## 2024-11-13 NOTE — TELEPHONE ENCOUNTER
Date of last visit:  7/18/2024  Date of next visit:  Visit date not found    Requested Prescriptions     Pending Prescriptions Disp Refills    sildenafil (VIAGRA) 50 MG tablet 30 tablet 3     Sig: Take 1 tablet by mouth as needed for Erectile Dysfunction

## 2024-11-14 RX ORDER — SILDENAFIL 50 MG/1
50 TABLET, FILM COATED ORAL PRN
Qty: 30 TABLET | Refills: 3 | Status: SHIPPED | OUTPATIENT
Start: 2024-11-14

## 2024-12-05 ENCOUNTER — ANESTHESIA (OUTPATIENT)
Dept: OPERATING ROOM | Age: 59
End: 2024-12-05
Payer: COMMERCIAL

## 2024-12-05 ENCOUNTER — HOSPITAL ENCOUNTER (OUTPATIENT)
Age: 59
Setting detail: OUTPATIENT SURGERY
Discharge: HOME | End: 2024-12-05
Attending: OPHTHALMOLOGY | Admitting: OPHTHALMOLOGY
Payer: COMMERCIAL

## 2024-12-05 ENCOUNTER — ANESTHESIA EVENT (OUTPATIENT)
Dept: OPERATING ROOM | Age: 59
End: 2024-12-05
Payer: COMMERCIAL

## 2024-12-05 VITALS
RESPIRATION RATE: 21 BRPM | SYSTOLIC BLOOD PRESSURE: 111 MMHG | TEMPERATURE: 96.8 F | DIASTOLIC BLOOD PRESSURE: 85 MMHG | HEART RATE: 60 BPM | OXYGEN SATURATION: 95 %

## 2024-12-05 PROCEDURE — 3700000000 HC ANESTHESIA ATTENDED CARE: Performed by: OPHTHALMOLOGY

## 2024-12-05 PROCEDURE — 2709999900 HC NON-CHARGEABLE SUPPLY: Performed by: OPHTHALMOLOGY

## 2024-12-05 PROCEDURE — 2720000010 HC SURG SUPPLY STERILE: Performed by: OPHTHALMOLOGY

## 2024-12-05 PROCEDURE — 6370000000 HC RX 637 (ALT 250 FOR IP): Performed by: OPHTHALMOLOGY

## 2024-12-05 PROCEDURE — 6360000002 HC RX W HCPCS

## 2024-12-05 PROCEDURE — 7100000010 HC PHASE II RECOVERY - FIRST 15 MIN: Performed by: OPHTHALMOLOGY

## 2024-12-05 PROCEDURE — 7100000001 HC PACU RECOVERY - ADDTL 15 MIN: Performed by: OPHTHALMOLOGY

## 2024-12-05 PROCEDURE — 3700000001 HC ADD 15 MINUTES (ANESTHESIA): Performed by: OPHTHALMOLOGY

## 2024-12-05 PROCEDURE — 93005 ELECTROCARDIOGRAM TRACING: CPT | Performed by: ANESTHESIOLOGY

## 2024-12-05 PROCEDURE — 2580000003 HC RX 258: Performed by: OPHTHALMOLOGY

## 2024-12-05 PROCEDURE — 2580000003 HC RX 258: Performed by: ANESTHESIOLOGY

## 2024-12-05 PROCEDURE — 7100000000 HC PACU RECOVERY - FIRST 15 MIN: Performed by: OPHTHALMOLOGY

## 2024-12-05 PROCEDURE — 6360000002 HC RX W HCPCS: Performed by: OPHTHALMOLOGY

## 2024-12-05 PROCEDURE — 3600000014 HC SURGERY LEVEL 4 ADDTL 15MIN: Performed by: OPHTHALMOLOGY

## 2024-12-05 PROCEDURE — 3600000004 HC SURGERY LEVEL 4 BASE: Performed by: OPHTHALMOLOGY

## 2024-12-05 RX ORDER — SODIUM CHLORIDE, SODIUM LACTATE, POTASSIUM CHLORIDE, CALCIUM CHLORIDE 600; 310; 30; 20 MG/100ML; MG/100ML; MG/100ML; MG/100ML
INJECTION, SOLUTION INTRAVENOUS CONTINUOUS
Status: DISCONTINUED | OUTPATIENT
Start: 2024-12-05 | End: 2024-12-05 | Stop reason: HOSPADM

## 2024-12-05 RX ORDER — TOBRAMYCIN AND DEXAMETHASONE 3; 1 MG/ML; MG/ML
1 SUSPENSION/ DROPS OPHTHALMIC ONCE
Status: COMPLETED | OUTPATIENT
Start: 2024-12-05 | End: 2024-12-05

## 2024-12-05 RX ORDER — ERYTHROMYCIN 5 MG/G
OINTMENT OPHTHALMIC PRN
Status: DISCONTINUED | OUTPATIENT
Start: 2024-12-05 | End: 2024-12-05 | Stop reason: HOSPADM

## 2024-12-05 RX ORDER — FENTANYL CITRATE 50 UG/ML
50 INJECTION, SOLUTION INTRAMUSCULAR; INTRAVENOUS EVERY 5 MIN PRN
Status: DISCONTINUED | OUTPATIENT
Start: 2024-12-05 | End: 2024-12-05 | Stop reason: HOSPADM

## 2024-12-05 RX ORDER — WATER 10 ML/10ML
INJECTION INTRAMUSCULAR; INTRAVENOUS; SUBCUTANEOUS PRN
Status: DISCONTINUED | OUTPATIENT
Start: 2024-12-05 | End: 2024-12-05 | Stop reason: HOSPADM

## 2024-12-05 RX ORDER — PROPOFOL 10 MG/ML
INJECTION, EMULSION INTRAVENOUS
Status: DISCONTINUED | OUTPATIENT
Start: 2024-12-05 | End: 2024-12-05 | Stop reason: SDUPTHER

## 2024-12-05 RX ORDER — TROPICAMIDE 10 MG/ML
1 SOLUTION/ DROPS OPHTHALMIC
Status: COMPLETED | OUTPATIENT
Start: 2024-12-05 | End: 2024-12-05

## 2024-12-05 RX ORDER — SODIUM CHLORIDE 0.9 % (FLUSH) 0.9 %
5-40 SYRINGE (ML) INJECTION PRN
Status: DISCONTINUED | OUTPATIENT
Start: 2024-12-05 | End: 2024-12-05 | Stop reason: HOSPADM

## 2024-12-05 RX ORDER — METOCLOPRAMIDE HYDROCHLORIDE 5 MG/ML
10 INJECTION INTRAMUSCULAR; INTRAVENOUS
Status: DISCONTINUED | OUTPATIENT
Start: 2024-12-05 | End: 2024-12-05 | Stop reason: HOSPADM

## 2024-12-05 RX ORDER — MIDAZOLAM HYDROCHLORIDE 1 MG/ML
INJECTION, SOLUTION INTRAMUSCULAR; INTRAVENOUS
Status: DISCONTINUED | OUTPATIENT
Start: 2024-12-05 | End: 2024-12-05 | Stop reason: SDUPTHER

## 2024-12-05 RX ORDER — ONDANSETRON 2 MG/ML
4 INJECTION INTRAMUSCULAR; INTRAVENOUS
Status: DISCONTINUED | OUTPATIENT
Start: 2024-12-05 | End: 2024-12-05 | Stop reason: HOSPADM

## 2024-12-05 RX ORDER — FENTANYL CITRATE 50 UG/ML
25 INJECTION, SOLUTION INTRAMUSCULAR; INTRAVENOUS EVERY 5 MIN PRN
Status: DISCONTINUED | OUTPATIENT
Start: 2024-12-05 | End: 2024-12-05 | Stop reason: HOSPADM

## 2024-12-05 RX ORDER — LIDOCAINE HYDROCHLORIDE 10 MG/ML
INJECTION, SOLUTION EPIDURAL; INFILTRATION; INTRACAUDAL; PERINEURAL
Status: DISCONTINUED | OUTPATIENT
Start: 2024-12-05 | End: 2024-12-05 | Stop reason: SDUPTHER

## 2024-12-05 RX ORDER — IPRATROPIUM BROMIDE AND ALBUTEROL SULFATE 2.5; .5 MG/3ML; MG/3ML
1 SOLUTION RESPIRATORY (INHALATION)
Status: DISCONTINUED | OUTPATIENT
Start: 2024-12-05 | End: 2024-12-05 | Stop reason: HOSPADM

## 2024-12-05 RX ORDER — SODIUM CHLORIDE 9 MG/ML
INJECTION, SOLUTION INTRAVENOUS PRN
Status: DISCONTINUED | OUTPATIENT
Start: 2024-12-05 | End: 2024-12-05 | Stop reason: HOSPADM

## 2024-12-05 RX ORDER — CEFTAZIDIME 1 G/1
INJECTION, POWDER, FOR SOLUTION INTRAMUSCULAR; INTRAVENOUS PRN
Status: DISCONTINUED | OUTPATIENT
Start: 2024-12-05 | End: 2024-12-05 | Stop reason: HOSPADM

## 2024-12-05 RX ORDER — SODIUM CHLORIDE 0.9 % (FLUSH) 0.9 %
5-40 SYRINGE (ML) INJECTION EVERY 12 HOURS SCHEDULED
Status: DISCONTINUED | OUTPATIENT
Start: 2024-12-05 | End: 2024-12-05 | Stop reason: HOSPADM

## 2024-12-05 RX ORDER — PHENYLEPHRINE HYDROCHLORIDE 100 MG/ML
1 SOLUTION/ DROPS OPHTHALMIC
Status: COMPLETED | OUTPATIENT
Start: 2024-12-05 | End: 2024-12-05

## 2024-12-05 RX ORDER — TROPICAMIDE 10 MG/ML
SOLUTION/ DROPS OPHTHALMIC PRN
Status: DISCONTINUED | OUTPATIENT
Start: 2024-12-05 | End: 2024-12-05 | Stop reason: HOSPADM

## 2024-12-05 RX ORDER — NALOXONE HYDROCHLORIDE 0.4 MG/ML
INJECTION, SOLUTION INTRAMUSCULAR; INTRAVENOUS; SUBCUTANEOUS PRN
Status: DISCONTINUED | OUTPATIENT
Start: 2024-12-05 | End: 2024-12-05 | Stop reason: HOSPADM

## 2024-12-05 RX ORDER — LABETALOL HYDROCHLORIDE 5 MG/ML
10 INJECTION, SOLUTION INTRAVENOUS
Status: DISCONTINUED | OUTPATIENT
Start: 2024-12-05 | End: 2024-12-05 | Stop reason: HOSPADM

## 2024-12-05 RX ADMIN — PHENYLEPHRINE HYDROCHLORIDE 1 DROP: 100 SOLUTION/ DROPS OPHTHALMIC at 14:26

## 2024-12-05 RX ADMIN — PROPOFOL 80 MG: 10 INJECTION, EMULSION INTRAVENOUS at 16:57

## 2024-12-05 RX ADMIN — SODIUM CHLORIDE, POTASSIUM CHLORIDE, SODIUM LACTATE AND CALCIUM CHLORIDE: 600; 310; 30; 20 INJECTION, SOLUTION INTRAVENOUS at 16:53

## 2024-12-05 RX ADMIN — MIDAZOLAM 2 MG: 1 INJECTION INTRAMUSCULAR; INTRAVENOUS at 16:55

## 2024-12-05 RX ADMIN — PHENYLEPHRINE HYDROCHLORIDE 1 DROP: 100 SOLUTION/ DROPS OPHTHALMIC at 14:20

## 2024-12-05 RX ADMIN — TROPICAMIDE 1 DROP: 10 SOLUTION/ DROPS OPHTHALMIC at 14:26

## 2024-12-05 RX ADMIN — TROPICAMIDE 1 DROP: 10 SOLUTION/ DROPS OPHTHALMIC at 14:20

## 2024-12-05 RX ADMIN — TOBRAMYCIN AND DEXAMETHASONE 1 DROP: 3; 1 SUSPENSION/ DROPS OPHTHALMIC at 14:16

## 2024-12-05 RX ADMIN — LIDOCAINE HYDROCHLORIDE 50 MG: 10 INJECTION, SOLUTION EPIDURAL; INFILTRATION; INTRACAUDAL at 16:57

## 2024-12-05 RX ADMIN — TROPICAMIDE 1 DROP: 10 SOLUTION/ DROPS OPHTHALMIC at 14:17

## 2024-12-05 RX ADMIN — PHENYLEPHRINE HYDROCHLORIDE 1 DROP: 100 SOLUTION/ DROPS OPHTHALMIC at 14:17

## 2024-12-05 ASSESSMENT — PAIN - FUNCTIONAL ASSESSMENT
PAIN_FUNCTIONAL_ASSESSMENT: ACTIVITIES ARE NOT PREVENTED
PAIN_FUNCTIONAL_ASSESSMENT: 0-10

## 2024-12-05 NOTE — ANESTHESIA PRE PROCEDURE
Department of Anesthesiology  Preprocedure Note       Name:  Toño Espinal   Age:  59 y.o.  :  1965                                          MRN:  [id number]         Date:  2024      Surgeon: Surgeon(s):  Rajesh Ramon MD    Procedure: ** ADD ON** LEFT EYE VITRECTOMY, GAS FLUI, LASER  ( PATIENT COMING FROM [place]) (Left)    Medications prior to admission:   Prior to Admission medications    Medication Sig Start Date End Date Taking? Authorizing Provider   sildenafil (VIAGRA) 50 MG tablet Take 1 tablet by mouth as needed for Erectile Dysfunction 24   Anastacio Boyce MD   sildenafil (REVATIO) 20 MG tablet Take 1-5 tablets by mouth as needed (ED) 23   Anastacio Boyce MD   Red Yeast Rice 600 MG CAPS Take 1 capsule by mouth daily    Bishop Lopez MD   VITAMIN D PO Take by mouth    Bishop Lopez MD   Multiple Vitamins-Minerals (THERAPEUTIC MULTIVITAMIN-MINERALS) tablet Take 1 tablet by mouth daily    ProviderBishop MD       Current medications:    Current Facility-Administered Medications   Medication Dose Route Frequency Provider Last Rate Last Admin    tobramycin-dexAMETHasone (TOBRADEX) ophthalmic suspension 1 drop  1 drop Left Eye Once Rajesh Ramon MD        tropicamide (MYDRIACYL) 1 % ophthalmic solution 1 drop  1 drop Left Eye Q5 Min Rajesh Ramon MD        phenylephrine (ELOISA-SYNEPHRINE) 10 % ophthalmic solution 1 drop  1 drop Left Eye Q5 Min Rajesh Ramon MD           Allergies:    Allergies   Allergen Reactions    Latex Rash       Problem List:    Patient Active Problem List   Diagnosis Code    Acute appendicitis K35.80    Retinal detachment of right eye with multiple retinal tears H33.021    Elevated PSA R97.20    Erectile dysfunction N52.9       Past Medical History:        Diagnosis Date    Anterior subcapsular polar age-related cataract of right eye 2019    Detached retina, right 2019    ED (erectile dysfunction)     H/O prostate biopsy

## 2024-12-05 NOTE — DISCHARGE INSTRUCTIONS
No alcoholic beverages, no driving or operating machinery, no making important decisions for 24 hours.   You may have a normal diet but should eat lightly day of surgery.  Drink plenty of fluids.  Urinate within 8 hours after surgery, if unable to urinate call your doctor

## 2024-12-05 NOTE — OP NOTE
Operative Note      Patient: Toño Espinal  YOB: 1965  MRN: [id number]    Date of Procedure: 12/5/2024    Pre-operative Diagnosis: RETINAL DETACHMENT LEFTEYE    Post-operative Diagnosis: Same    Procedure: Vitrectomy, Gas-Fluid Exchange, Endolaser Left Eye    [place]est[place]: [facility] [place]    Surgeons/Assistants: Rajesh Ramon MD    Estimated Blood Loss: Less than 1 ML    Complications: None    Specimens: None    Reason for Operation:    Patient wishes to proceed with surgery in order to prevent further vision loss. Patient understands vision may not return to normal, and may take 6 - 12 months to reach maximum level. Patient understands risks include bleeding, infection and the possibility of recurrent vision loss. A cataract will probably develop within 3-12 months.  Procedure:  The patient was brought to the operating room in good condition. Transient Propofol was given. Retrobulbar and topical anaesthesia were administered. The patient was prepped and draped in the usual manner. 25 gauge vitrectomy trocars were inserted in the usual quadrants. Infusion was inserted in the inferior temporal quadrant. Light pipe and ocutome placed through the superior trocars. Vitreous was removed from anterior to posterior and trimmed out past the equator in all quadrants. Scleral depression was done in the inferior 180 degrees to trim vitreous near the vitreous base. Scleral depression identified multiple retinal breaks between 9:30 and 2:30, and a large tear in attached retina at 5 . A retinotomy was created near the posterior pole at 12 o'clock with intraocular diathermy. An air-fluid exchange reattached the retina nicely. Endolaser was placed around all retinal breaks and the retinotomy. Residual fluid was aspirated from over the optic nerve. The air was exchanged for 16% C3F8. The sites were sutured and SubTenons antibiotic injected in the inferior temporal quadrant. The eye was patched in the usual fashion and the

## 2024-12-06 LAB
EKG ATRIAL RATE: 64 BPM
EKG P AXIS: 32 DEGREES
EKG P-R INTERVAL: 184 MS
EKG Q-T INTERVAL: 388 MS
EKG QRS DURATION: 90 MS
EKG QTC CALCULATION (BAZETT): 400 MS
EKG R AXIS: -19 DEGREES
EKG T AXIS: 2 DEGREES
EKG VENTRICULAR RATE: 64 BPM

## 2024-12-06 NOTE — ANESTHESIA POSTPROCEDURE EVALUATION
Department of Anesthesiology  Postprocedure Note    Patient: Toño Espinal  MRN: [id number]  YOB: 1965  Date of evaluation: 12/5/2024    Procedure Summary       Date: 12/05/24 Room / Location: [address]    Anesthesia Start: 1653 Anesthesia Stop: 1819    Procedure: VITRECTOMY 25 GAUGE, AIR FLUID EXCHANGE, AIR GAS EXCHANGE OF 16% C3F8 , ENDOLASER 200MW 200MS 955 SPOTS (Left) Diagnosis:       Left retinal detachment      (Left retinal detachment [H33.22])    Surgeons: Rajesh Ramon MD Responsible Provider: Eran Corbin MD    Anesthesia Type: general ASA Status: 2            Anesthesia Type: No value filed.    Skyler Phase I: Skyler Score: 10    Skyler Phase II: Skyler Score: 10    Anesthesia Post Evaluation    Patient location during evaluation: PACU  Patient participation: complete - patient participated  Level of consciousness: awake  Pain score: 1  Airway patency: patent  Nausea & Vomiting: no nausea and no vomiting  Cardiovascular status: blood pressure returned to baseline and hemodynamically stable  Respiratory status: acceptable  Hydration status: euvolemic  Pain management: adequate    No notable events documented.

## 2025-01-27 ENCOUNTER — TELEMEDICINE (OUTPATIENT)
Dept: FAMILY MEDICINE CLINIC | Age: 60
End: 2025-01-27

## 2025-01-27 DIAGNOSIS — J20.9 ACUTE BRONCHITIS, UNSPECIFIED ORGANISM: Primary | ICD-10-CM

## 2025-01-27 DIAGNOSIS — J01.20 ACUTE NON-RECURRENT ETHMOIDAL SINUSITIS: ICD-10-CM

## 2025-01-27 RX ORDER — HYDROCODONE POLISTIREX AND CHLORPHENIRAMINE POLISTIREX 10; 8 MG/5ML; MG/5ML
5 SUSPENSION, EXTENDED RELEASE ORAL EVERY 12 HOURS PRN
Qty: 115 ML | Refills: 0 | Status: SHIPPED | OUTPATIENT
Start: 2025-01-27 | End: 2025-02-08

## 2025-01-27 SDOH — ECONOMIC STABILITY: FOOD INSECURITY: WITHIN THE PAST 12 MONTHS, YOU WORRIED THAT YOUR FOOD WOULD RUN OUT BEFORE YOU GOT MONEY TO BUY MORE.: NEVER TRUE

## 2025-01-27 SDOH — ECONOMIC STABILITY: FOOD INSECURITY: WITHIN THE PAST 12 MONTHS, THE FOOD YOU BOUGHT JUST DIDN'T LAST AND YOU DIDN'T HAVE MONEY TO GET MORE.: NEVER TRUE

## 2025-01-27 ASSESSMENT — PATIENT HEALTH QUESTIONNAIRE - PHQ9
SUM OF ALL RESPONSES TO PHQ9 QUESTIONS 1 & 2: 0
SUM OF ALL RESPONSES TO PHQ QUESTIONS 1-9: 0
SUM OF ALL RESPONSES TO PHQ QUESTIONS 1-9: 0
1. LITTLE INTEREST OR PLEASURE IN DOING THINGS: NOT AT ALL
2. FEELING DOWN, DEPRESSED OR HOPELESS: NOT AT ALL
SUM OF ALL RESPONSES TO PHQ QUESTIONS 1-9: 0
SUM OF ALL RESPONSES TO PHQ QUESTIONS 1-9: 0

## 2025-01-27 ASSESSMENT — ENCOUNTER SYMPTOMS
CONSTIPATION: 0
TROUBLE SWALLOWING: 0
NAUSEA: 0
EYE PAIN: 0
BLOOD IN STOOL: 0
BACK PAIN: 0
CHEST TIGHTNESS: 0
ABDOMINAL PAIN: 0
SORE THROAT: 0
COUGH: 0
SHORTNESS OF BREATH: 0

## 2025-01-27 NOTE — PROGRESS NOTES
Toño agreed to Video Chat/Exam in presence of Dr Boyce and myself. Verified who was present in room with Toño. Toño informed the e-mail address used to Face Time cannot be used to contact the Provider, if they are any questions or concerns they need to call the office directly. Toño stated understanding.

## 2025-01-27 NOTE — PROGRESS NOTES
Toño Espinal, was evaluated through a synchronous (real-time) audio-video encounter. The patient (or guardian if applicable) is aware that this is a billable service, which includes applicable co-pays. This Virtual Visit was conducted with patient's (and/or legal guardian's) consent. Patient identification was verified, and a caregiver was present when appropriate.   The patient was located at Home: [address] [place] [id number]  Provider was located at Facility (Appt Dept): [address] [place] [address]  Confirm you are appropriately licensed, registered, or certified to deliver care in the state where the patient is located as indicated above. If you are not or unsure, please re-schedule the visit: Yes, I confirm.     Toño Espinal (:  1965) is a Established patient, presenting virtually for evaluation of the following:      Below is the assessment and plan developed based on review of pertinent history, physical exam, labs, studies, and medications.     Assessment & Plan        ICD-10-CM    1. Acute bronchitis, unspecified organism  J20.9 amoxicillin-clavulanate (AUGMENTIN) 875-125 MG per tablet     HYDROcodone-chlorpheniramine (TUSSIONEX) 10-8 MG/5ML SUER      2. Acute non-recurrent ethmoidal sinusitis  J01.20 amoxicillin-clavulanate (AUGMENTIN) 875-125 MG per tablet             PLAN    Current Outpatient Medications   Medication Sig Dispense Refill    amoxicillin-clavulanate (AUGMENTIN) 875-125 MG per tablet Take 1 tablet by mouth 2 times daily for 10 days 20 tablet 0    HYDROcodone-chlorpheniramine (TUSSIONEX) 10-8 MG/5ML SUER Take 5 mLs by mouth every 12 hours as needed (cough) for up to 12 days. Max Daily Amount: 10 mLs 115 mL 0    sildenafil (VIAGRA) 50 MG tablet Take 1 tablet by mouth as needed for Erectile Dysfunction 30 tablet 3    Red Yeast Rice 600 MG CAPS Take 1 capsule by mouth daily      VITAMIN D PO Take by mouth      Multiple Vitamins-Minerals (THERAPEUTIC

## 2025-01-28 NOTE — PROGRESS NOTES
Notifed dr ruvalcaba pt had televisit 1/27/25 pcp dx pt ,acute bronchitis,sinusitis.or cancelled 1/28/25.or 1/30/25,notifed dr hendrickson,anesthesia of dx acute bronchitis,antibiotics x 10 days.ok for surgery if retinal detachment and deemed emergent.dr ruvalcaba aware.

## 2025-01-30 ENCOUNTER — ANESTHESIA EVENT (OUTPATIENT)
Dept: OPERATING ROOM | Age: 60
End: 2025-01-30
Payer: COMMERCIAL

## 2025-01-30 ENCOUNTER — HOSPITAL ENCOUNTER (OUTPATIENT)
Age: 60
Setting detail: OUTPATIENT SURGERY
Discharge: HOME | End: 2025-01-30
Attending: OPHTHALMOLOGY | Admitting: OPHTHALMOLOGY
Payer: COMMERCIAL

## 2025-01-30 ENCOUNTER — ANESTHESIA (OUTPATIENT)
Dept: OPERATING ROOM | Age: 60
End: 2025-01-30
Payer: COMMERCIAL

## 2025-01-30 VITALS
DIASTOLIC BLOOD PRESSURE: 74 MMHG | HEART RATE: 63 BPM | TEMPERATURE: 97.5 F | RESPIRATION RATE: 10 BRPM | SYSTOLIC BLOOD PRESSURE: 110 MMHG | OXYGEN SATURATION: 97 %

## 2025-01-30 PROBLEM — H33.42 TRACTION RETINAL DETACHMENT INVOLVING MACULA, LEFT: Status: ACTIVE | Noted: 2025-01-30

## 2025-01-30 PROCEDURE — 7100000000 HC PACU RECOVERY - FIRST 15 MIN: Performed by: OPHTHALMOLOGY

## 2025-01-30 PROCEDURE — C1814 RETINAL TAMP, SILICONE OIL: HCPCS | Performed by: OPHTHALMOLOGY

## 2025-01-30 PROCEDURE — 2580000003 HC RX 258: Performed by: OPHTHALMOLOGY

## 2025-01-30 PROCEDURE — 2720000010 HC SURG SUPPLY STERILE: Performed by: OPHTHALMOLOGY

## 2025-01-30 PROCEDURE — 2709999900 HC NON-CHARGEABLE SUPPLY: Performed by: OPHTHALMOLOGY

## 2025-01-30 PROCEDURE — 7100000001 HC PACU RECOVERY - ADDTL 15 MIN: Performed by: OPHTHALMOLOGY

## 2025-01-30 PROCEDURE — 6370000000 HC RX 637 (ALT 250 FOR IP): Performed by: OPHTHALMOLOGY

## 2025-01-30 PROCEDURE — C1784 OCULAR DEV, INTRAOP, DET RET: HCPCS | Performed by: OPHTHALMOLOGY

## 2025-01-30 PROCEDURE — 6360000002 HC RX W HCPCS: Performed by: OPHTHALMOLOGY

## 2025-01-30 PROCEDURE — 3600000014 HC SURGERY LEVEL 4 ADDTL 15MIN: Performed by: OPHTHALMOLOGY

## 2025-01-30 PROCEDURE — 7100000011 HC PHASE II RECOVERY - ADDTL 15 MIN: Performed by: OPHTHALMOLOGY

## 2025-01-30 PROCEDURE — 2580000003 HC RX 258

## 2025-01-30 PROCEDURE — 3600000004 HC SURGERY LEVEL 4 BASE: Performed by: OPHTHALMOLOGY

## 2025-01-30 PROCEDURE — 3700000000 HC ANESTHESIA ATTENDED CARE: Performed by: OPHTHALMOLOGY

## 2025-01-30 PROCEDURE — 2500000003 HC RX 250 WO HCPCS

## 2025-01-30 PROCEDURE — 3700000001 HC ADD 15 MINUTES (ANESTHESIA): Performed by: OPHTHALMOLOGY

## 2025-01-30 PROCEDURE — 7100000010 HC PHASE II RECOVERY - FIRST 15 MIN: Performed by: OPHTHALMOLOGY

## 2025-01-30 PROCEDURE — 6360000002 HC RX W HCPCS

## 2025-01-30 PROCEDURE — 2780000010 HC IMPLANT OTHER: Performed by: OPHTHALMOLOGY

## 2025-01-30 DEVICE — SILIKON™ 1000 (PURIFIED POLYDIMETHYLSILOXANE) IS HIGHLY PURIFIED LONG CHAIN POLYDIMETHYLSILOXANE TRIMETHYLSILOXY TERMINATED SILICONE OIL. IT IS STERILE,NON-PYROGENIC, CLEAR, COLORLESS AND HAS A VISCOSITY OF 1000 CS. FOR USE AS A POST-OPERATIVE RETINAL TAMPONADE DURING VITREORETINAL SURGERY. SILIKON™ 1000IS COMPOSED OF SILICON, OXYGEN, CARBON AND HYDROGEN ATOMS. SILIKON™ 1000 IS IMMISCIBLE WITH AQUEOUS COMPONENTS AND IS RELATIVELY INERT MATERIAL WITHLITTLE BIOLOGICAL TOXICITY POTENTIAL.
Type: IMPLANTABLE DEVICE | Site: EYE | Status: FUNCTIONAL
Brand: SILIKON™

## 2025-01-30 DEVICE — IMPL EYE SLV SCLERAL BCKL SIL 70 2.1MM: Type: IMPLANTABLE DEVICE | Site: EYE | Status: FUNCTIONAL

## 2025-01-30 DEVICE — IMPLANTABLE DEVICE: Type: IMPLANTABLE DEVICE | Site: EYE | Status: FUNCTIONAL

## 2025-01-30 RX ORDER — ROCURONIUM BROMIDE 10 MG/ML
INJECTION, SOLUTION INTRAVENOUS
Status: DISCONTINUED | OUTPATIENT
Start: 2025-01-30 | End: 2025-01-30 | Stop reason: SDUPTHER

## 2025-01-30 RX ORDER — DEXAMETHASONE SODIUM PHOSPHATE 10 MG/ML
INJECTION INTRAMUSCULAR; INTRAVENOUS
Status: DISCONTINUED | OUTPATIENT
Start: 2025-01-30 | End: 2025-01-30 | Stop reason: SDUPTHER

## 2025-01-30 RX ORDER — NALOXONE HYDROCHLORIDE 0.4 MG/ML
INJECTION, SOLUTION INTRAMUSCULAR; INTRAVENOUS; SUBCUTANEOUS PRN
Status: CANCELLED | OUTPATIENT
Start: 2025-01-30

## 2025-01-30 RX ORDER — SODIUM CHLORIDE 0.9 % (FLUSH) 0.9 %
5-40 SYRINGE (ML) INJECTION EVERY 12 HOURS SCHEDULED
Status: CANCELLED | OUTPATIENT
Start: 2025-01-30

## 2025-01-30 RX ORDER — ONDANSETRON 2 MG/ML
INJECTION INTRAMUSCULAR; INTRAVENOUS
Status: DISCONTINUED | OUTPATIENT
Start: 2025-01-30 | End: 2025-01-30 | Stop reason: SDUPTHER

## 2025-01-30 RX ORDER — SODIUM CHLORIDE, SODIUM LACTATE, POTASSIUM CHLORIDE, CALCIUM CHLORIDE 600; 310; 30; 20 MG/100ML; MG/100ML; MG/100ML; MG/100ML
INJECTION, SOLUTION INTRAVENOUS CONTINUOUS
Status: CANCELLED | OUTPATIENT
Start: 2025-01-30

## 2025-01-30 RX ORDER — LABETALOL HYDROCHLORIDE 5 MG/ML
INJECTION, SOLUTION INTRAVENOUS
Status: DISCONTINUED | OUTPATIENT
Start: 2025-01-30 | End: 2025-01-30 | Stop reason: SDUPTHER

## 2025-01-30 RX ORDER — PHENYLEPHRINE HYDROCHLORIDE 100 MG/ML
1 SOLUTION/ DROPS OPHTHALMIC EVERY 5 MIN PRN
Status: COMPLETED | OUTPATIENT
Start: 2025-01-30 | End: 2025-01-30

## 2025-01-30 RX ORDER — GENTAMICIN 40 MG/ML
INJECTION, SOLUTION INTRAMUSCULAR; INTRAVENOUS PRN
Status: DISCONTINUED | OUTPATIENT
Start: 2025-01-30 | End: 2025-01-30 | Stop reason: HOSPADM

## 2025-01-30 RX ORDER — FENTANYL CITRATE 50 UG/ML
INJECTION, SOLUTION INTRAMUSCULAR; INTRAVENOUS
Status: DISCONTINUED | OUTPATIENT
Start: 2025-01-30 | End: 2025-01-30 | Stop reason: SDUPTHER

## 2025-01-30 RX ORDER — TOBRAMYCIN AND DEXAMETHASONE 3; 1 MG/ML; MG/ML
1 SUSPENSION/ DROPS OPHTHALMIC ONCE
Status: COMPLETED | OUTPATIENT
Start: 2025-01-30 | End: 2025-01-30

## 2025-01-30 RX ORDER — SODIUM CHLORIDE 9 MG/ML
INJECTION, SOLUTION INTRAMUSCULAR; INTRAVENOUS; SUBCUTANEOUS PRN
Status: DISCONTINUED | OUTPATIENT
Start: 2025-01-30 | End: 2025-01-30 | Stop reason: HOSPADM

## 2025-01-30 RX ORDER — MIDAZOLAM HYDROCHLORIDE 1 MG/ML
INJECTION, SOLUTION INTRAMUSCULAR; INTRAVENOUS
Status: DISCONTINUED | OUTPATIENT
Start: 2025-01-30 | End: 2025-01-30 | Stop reason: SDUPTHER

## 2025-01-30 RX ORDER — PROPOFOL 10 MG/ML
INJECTION, EMULSION INTRAVENOUS
Status: DISCONTINUED | OUTPATIENT
Start: 2025-01-30 | End: 2025-01-30 | Stop reason: SDUPTHER

## 2025-01-30 RX ORDER — ERYTHROMYCIN 5 MG/G
OINTMENT OPHTHALMIC PRN
Status: DISCONTINUED | OUTPATIENT
Start: 2025-01-30 | End: 2025-01-30 | Stop reason: HOSPADM

## 2025-01-30 RX ORDER — LIDOCAINE HYDROCHLORIDE 10 MG/ML
INJECTION, SOLUTION EPIDURAL; INFILTRATION; INTRACAUDAL; PERINEURAL
Status: DISCONTINUED | OUTPATIENT
Start: 2025-01-30 | End: 2025-01-30 | Stop reason: SDUPTHER

## 2025-01-30 RX ORDER — FENTANYL CITRATE 50 UG/ML
25 INJECTION, SOLUTION INTRAMUSCULAR; INTRAVENOUS EVERY 5 MIN PRN
Status: CANCELLED | OUTPATIENT
Start: 2025-01-30

## 2025-01-30 RX ORDER — PROCHLORPERAZINE EDISYLATE 5 MG/ML
5 INJECTION INTRAMUSCULAR; INTRAVENOUS
Status: CANCELLED | OUTPATIENT
Start: 2025-01-30 | End: 2025-01-31

## 2025-01-30 RX ORDER — GLYCOPYRROLATE 1 MG/5 ML
SYRINGE (ML) INTRAVENOUS
Status: DISCONTINUED | OUTPATIENT
Start: 2025-01-30 | End: 2025-01-30 | Stop reason: SDUPTHER

## 2025-01-30 RX ORDER — SODIUM CHLORIDE, SODIUM LACTATE, POTASSIUM CHLORIDE, CALCIUM CHLORIDE 600; 310; 30; 20 MG/100ML; MG/100ML; MG/100ML; MG/100ML
INJECTION, SOLUTION INTRAVENOUS
Status: DISCONTINUED | OUTPATIENT
Start: 2025-01-30 | End: 2025-01-30 | Stop reason: SDUPTHER

## 2025-01-30 RX ORDER — CYCLOPENTOLATE HYDROCHLORIDE 10 MG/ML
SOLUTION/ DROPS OPHTHALMIC PRN
Status: DISCONTINUED | OUTPATIENT
Start: 2025-01-30 | End: 2025-01-30 | Stop reason: HOSPADM

## 2025-01-30 RX ORDER — ONDANSETRON 2 MG/ML
4 INJECTION INTRAMUSCULAR; INTRAVENOUS
Status: CANCELLED | OUTPATIENT
Start: 2025-01-30 | End: 2025-01-31

## 2025-01-30 RX ORDER — TROPICAMIDE 10 MG/ML
1 SOLUTION/ DROPS OPHTHALMIC EVERY 5 MIN PRN
Status: COMPLETED | OUTPATIENT
Start: 2025-01-30 | End: 2025-01-30

## 2025-01-30 RX ADMIN — ONDANSETRON 4 MG: 2 INJECTION INTRAMUSCULAR; INTRAVENOUS at 14:10

## 2025-01-30 RX ADMIN — Medication 0.2 MG: at 13:22

## 2025-01-30 RX ADMIN — DEXAMETHASONE SODIUM PHOSPHATE 10 MG: 10 INJECTION INTRAMUSCULAR; INTRAVENOUS at 12:58

## 2025-01-30 RX ADMIN — PHENYLEPHRINE HYDROCHLORIDE 1 DROP: 100 SOLUTION/ DROPS OPHTHALMIC at 10:32

## 2025-01-30 RX ADMIN — Medication 0.2 MG: at 13:04

## 2025-01-30 RX ADMIN — LIDOCAINE HYDROCHLORIDE 50 MG: 10 INJECTION, SOLUTION EPIDURAL; INFILTRATION; INTRACAUDAL; PERINEURAL at 12:42

## 2025-01-30 RX ADMIN — MIDAZOLAM 2 MG: 1 INJECTION INTRAMUSCULAR; INTRAVENOUS at 12:36

## 2025-01-30 RX ADMIN — PHENYLEPHRINE HYDROCHLORIDE 1 DROP: 100 SOLUTION/ DROPS OPHTHALMIC at 10:37

## 2025-01-30 RX ADMIN — Medication 0.4 MG: at 14:16

## 2025-01-30 RX ADMIN — ROCURONIUM BROMIDE 10 MG: 10 INJECTION, SOLUTION INTRAVENOUS at 13:47

## 2025-01-30 RX ADMIN — TROPICAMIDE 1 DROP: 10 SOLUTION/ DROPS OPHTHALMIC at 10:44

## 2025-01-30 RX ADMIN — PHENYLEPHRINE HYDROCHLORIDE 100 MCG: 10 INJECTION INTRAVENOUS at 14:07

## 2025-01-30 RX ADMIN — PHENYLEPHRINE HYDROCHLORIDE 50 MCG: 10 INJECTION INTRAVENOUS at 13:52

## 2025-01-30 RX ADMIN — FENTANYL CITRATE 50 MCG: 50 INJECTION, SOLUTION INTRAMUSCULAR; INTRAVENOUS at 14:32

## 2025-01-30 RX ADMIN — TROPICAMIDE 1 DROP: 10 SOLUTION/ DROPS OPHTHALMIC at 10:32

## 2025-01-30 RX ADMIN — PHENYLEPHRINE HYDROCHLORIDE 100 MCG: 10 INJECTION INTRAVENOUS at 14:13

## 2025-01-30 RX ADMIN — PROPOFOL 150 MG: 10 INJECTION, EMULSION INTRAVENOUS at 12:42

## 2025-01-30 RX ADMIN — ROCURONIUM BROMIDE 50 MG: 10 INJECTION, SOLUTION INTRAVENOUS at 12:42

## 2025-01-30 RX ADMIN — Medication 5 MG: at 13:09

## 2025-01-30 RX ADMIN — FENTANYL CITRATE 100 MCG: 50 INJECTION, SOLUTION INTRAMUSCULAR; INTRAVENOUS at 12:42

## 2025-01-30 RX ADMIN — ROCURONIUM BROMIDE 10 MG: 10 INJECTION, SOLUTION INTRAVENOUS at 13:11

## 2025-01-30 RX ADMIN — SODIUM CHLORIDE, POTASSIUM CHLORIDE, SODIUM LACTATE AND CALCIUM CHLORIDE: 600; 310; 30; 20 INJECTION, SOLUTION INTRAVENOUS at 12:42

## 2025-01-30 RX ADMIN — SODIUM CHLORIDE, POTASSIUM CHLORIDE, SODIUM LACTATE AND CALCIUM CHLORIDE: 600; 310; 30; 20 INJECTION, SOLUTION INTRAVENOUS at 13:41

## 2025-01-30 RX ADMIN — TOBRAMYCIN AND DEXAMETHASONE 1 DROP: 3; 1 SUSPENSION/ DROPS OPHTHALMIC at 10:32

## 2025-01-30 RX ADMIN — TROPICAMIDE 1 DROP: 10 SOLUTION/ DROPS OPHTHALMIC at 10:37

## 2025-01-30 RX ADMIN — PHENYLEPHRINE HYDROCHLORIDE 1 DROP: 100 SOLUTION/ DROPS OPHTHALMIC at 10:44

## 2025-01-30 RX ADMIN — FENTANYL CITRATE 50 MCG: 50 INJECTION, SOLUTION INTRAMUSCULAR; INTRAVENOUS at 13:15

## 2025-01-30 RX ADMIN — PHENYLEPHRINE HYDROCHLORIDE 50 MCG: 10 INJECTION INTRAVENOUS at 13:54

## 2025-01-30 RX ADMIN — PHENYLEPHRINE HYDROCHLORIDE 100 MCG: 10 INJECTION INTRAVENOUS at 13:37

## 2025-01-30 ASSESSMENT — PAIN SCALES - GENERAL
PAINLEVEL_OUTOF10: 0

## 2025-01-30 ASSESSMENT — PAIN - FUNCTIONAL ASSESSMENT: PAIN_FUNCTIONAL_ASSESSMENT: 0-10

## 2025-01-30 NOTE — BRIEF OP NOTE
Brief Postoperative Note      Patient: Toño Espinal  YOB: 1965  MRN: [id number]    Date of Procedure: 1/30/2025    Pre-Op Diagnosis Codes:      * Left retinal detachment [H33.22]    Post-Op Diagnosis: Same       Procedure(s):  VITRECTOMY 23 GAUGE, AIR FLUID EXCHANGE, SCLERAL BUCKLE, ENDOLASER 200   SPOTS, SILICONE OIL INJECTION, METHOTREXATE INJECTION    Surgeon(s):  Rajesh Ramon MD    Assistant:  * No surgical staff found *    Anesthesia: General    Estimated Blood Loss (mL): Minimal    Complications: None    Specimens:   * No specimens in log *    Implants:  Implant Name Type Inv. Item Serial No.  Lot No. LRB No. Used Action   IMPL EYE SLV SCLERAL BCKL CHET 70 2.1MM - [id number] Eye IMPL EYE SLV SCLERAL BCKL CHET 70 2.1MM  [facility] [employer]-PMM [id number] Left 1 Implanted   BAND CIR L125MM 2X0.75MM 4.0 - [id number]  BAND CIR L125MM 2X0.75MM 4.0  [employer] [id number] Left 1 Implanted   [employer] 8.5ML CLR POLYDIMETHYLSILOXANE RETIN - [id number]  AGENT VISCOELAST[employer] 8.5ML CLR POLYDIMETHYLSILOXANE RETIN  [employer]WD [id number] Left 1 Implanted   AGENT VISCOELASTIC 8.5ML CLR POLYDIMETHYLSILOXANE RETIN - [id number]  AGENT VISCOELASTIC 8.5ML CLR POLYDIMETHYLSILOXANE RETIN  [employer]-WD [id number] Left 1 Implanted         Drains: * No LDAs found *    Findings:  Infection Present At Time Of Surgery (PATOS) (choose all levels that have infection present):  No infection present  Other Findings:     Electronically signed by Rajesh Ramon MD on 1/30/2025 at 2:40 PM

## 2025-01-30 NOTE — H&P
History and Physical    Pt Name: Toño Espinal  MRN: [id number]  YOB: 1965  Date of evaluation: 1/30/2025  Primary Care Physician: Anastacio Boyce MD    SUBJECTIVE:   History of Chief Complaint:    Toño Esipnal is a 59 y.o. male who is scheduled today for VITRECTOMY 23 GAUGE, MEMBRANE PEEL, GAS FLUID EXCHANGE, SCLERAL BUCKLE, METHOTREXATE INJECTION, POSSIBLE SILICONE OIL INJECTION - Left. He is s/p left vitrectomy on 12/5/24 with Dr. Ramon for left retinal detachment. He reports left retina has since \"re-tore\". He reports noticing floaters approximately 3 weeks ago and a film over his vision over a week ago.   Allergies  is allergic to latex and demerol [meperidine hcl].  Medications  Prior to Admission medications    Medication Sig Start Date End Date Taking? Authorizing Provider   amoxicillin-clavulanate (AUGMENTIN) 875-125 MG per tablet Take 1 tablet by mouth 2 times daily for 10 days 1/27/25 2/6/25 Yes Anastacio Boyce MD   HYDROcodone-chlorpheniramine (TUSSIONEX) 10-8 MG/5ML SUER Take 5 mLs by mouth every 12 hours as needed (cough) for up to 12 days. Max Daily Amount: 10 mLs 1/27/25 2/8/25 Yes Anastacio Boyce MD   sildenafil (VIAGRA) 50 MG tablet Take 1 tablet by mouth as needed for Erectile Dysfunction 11/14/24  Yes Anastacio Boyce MD   Red Yeast Rice 600 MG CAPS Take 1 capsule by mouth daily   Yes Bishop Lopez MD   VITAMIN D PO Take by mouth   Yes Bishop Lopez MD   Multiple Vitamins-Minerals (THERAPEUTIC MULTIVITAMIN-MINERALS) tablet Take 1 tablet by mouth daily   Yes Bishop Lopez MD     Past Medical History    has a past medical history of Acute bronchitis, Anterior subcapsular polar age-related cataract of right eye, Common cold, Detached retina, right, ED (erectile dysfunction), Floaters, left, H/O prostate biopsy, History of BPH, Latex allergy, Retinal detachment, and Under care of team.  Past Surgical History   has a past surgical history

## 2025-01-30 NOTE — ANESTHESIA PRE PROCEDURE
Department of Anesthesiology  Preprocedure Note       Name:  Toño Espinal   Age:  59 y.o.  :  1965                                          MRN:  [id number]         Date:  2025      Surgeon: Surgeon(s):  Rajesh Ramon MD    Procedure: Procedure(s):  VITRECTOMY 23 GAUGE, MEMBRANE PEEL, GAS FLUID EXCHANGE, SCLERAL BUCKLE, METHOTREXATE INJECTION, POSSIBLE SILICONE OIL INJECTION    Medications prior to admission:   Prior to Admission medications    Medication Sig Start Date End Date Taking? Authorizing Provider   amoxicillin-clavulanate (AUGMENTIN) 875-125 MG per tablet Take 1 tablet by mouth 2 times daily for 10 days 25 Yes Anastaico Boyce MD   HYDROcodone-chlorpheniramine (TUSSIONEX) 10-8 MG/5ML SUER Take 5 mLs by mouth every 12 hours as needed (cough) for up to 12 days. Max Daily Amount: 10 mLs 25 Yes Anastacio Boyce MD   sildenafil (VIAGRA) 50 MG tablet Take 1 tablet by mouth as needed for Erectile Dysfunction 24  Yes Anastacio Boyce MD   Red Yeast Rice 600 MG CAPS Take 1 capsule by mouth daily   Yes Bishop Lopez MD   VITAMIN D PO Take by mouth   Yes Bishop Lopez MD   Multiple Vitamins-Minerals (THERAPEUTIC MULTIVITAMIN-MINERALS) tablet Take 1 tablet by mouth daily   Yes Bishop Lopez MD       Current medications:    Current Facility-Administered Medications   Medication Dose Route Frequency Provider Last Rate Last Admin   • tobramycin-dexAMETHasone (TOBRADEX) ophthalmic suspension 1 drop  1 drop Left Eye Once Rajesh Ramon MD       • tropicamide (MYDRIACYL) 1 % ophthalmic solution 1 drop  1 drop Left Eye Q5 Min PRN Rajesh Ramon MD       • phenylephrine (ELOISA-SYNEPHRINE) 10 % ophthalmic solution 1 drop  1 drop Left Eye Q5 Min PRN Rajesh Ramon MD           Allergies:    Allergies   Allergen Reactions   • Latex Rash   • Demerol [Meperidine Hcl] Nausea And Vomiting       Problem List:    Patient Active Problem List

## 2025-01-31 NOTE — ANESTHESIA POSTPROCEDURE EVALUATION
Department of Anesthesiology  Postprocedure Note    Patient: Toño Espinal  MRN: [id number]  YOB: 1965  Date of evaluation: 1/31/2025    Procedure Summary       Date: 01/30/25 Room / Location: [address]    Anesthesia Start: 1236 Anesthesia Stop: 1438    Procedure: VITRECTOMY 23 GAUGE, AIR FLUID EXCHANGE, SCLERAL BUCKLE, ENDOLASER 200   SPOTS, SILICONE OIL INJECTION, METHOTREXATE INJECTION (Left) Diagnosis:       Left retinal detachment      (Left retinal detachment [H33.22])    Surgeons: Rajesh Ramon MD Responsible Provider: Wilmer Suresh MD    Anesthesia Type: TIVA ASA Status: 2            Anesthesia Type: No value filed.    Skyler Phase I: Skyler Score: 10    Skyler Phase II: Skyler Score: 10    Anesthesia Post Evaluation    Patient location during evaluation: PACU  Patient participation: complete - patient participated  Level of consciousness: awake and alert  Airway patency: patent  Nausea & Vomiting: no nausea and no vomiting  Cardiovascular status: blood pressure returned to baseline  Respiratory status: acceptable  Hydration status: euvolemic  Comments: No known anesthesia related complication  Multimodal analgesia pain management approach  Pain management: adequate        No notable events documented.

## 2025-02-04 NOTE — OP NOTE
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    PROCEDURE:  Pars plana vitrectomy, scleral buckle, membrane peeling, air-fluid exchange, silicone, endolaser and methotrexate, left eye.    ANESTHESIA:  General.    BLOOD LOSS:  Minimal.    COMPLICATIONS:  No complications.    SPECIMENS:  No specimens.    REASON FOR PROCEDURE:  The patient had previous repair of retinal detachment in the left eye approximately 5 weeks ago.  He has subsequently developed decreasing vision due to recurrent retinal detachment with proliferative vitreoretinopathy.  He elected to undergo surgery in hopes of reattaching the retina and limiting his vision loss.  He understands complications include bleeding, infection, and recurrent retinal detachment.  He understands it is very likely he will develop significant cataract in the next 1 or 2 years.    DESCRIPTION OF PROCEDURE:  The patient was brought to the operating room in good condition.  He was administered general endotracheal anesthesia and prepped and draped in usual fashion.  360 degree conjunctival peritomy was made 2 mm posterior to the limbus.  Each rectus muscle isolated on a 4-0 black silk traction suture.  Belt loop incisions were made in each quadrant.  A #41 band was placed in the eye and tied with a Watzke sleeve at 11 o'clock.  The band was pulled up to yield a

## 2025-04-02 LAB — PROSTATE SPECIFIC ANTIGEN: 5.34 NG/ML

## 2025-04-03 DIAGNOSIS — R97.20 ELEVATED PSA: Primary | ICD-10-CM

## 2025-04-10 ENCOUNTER — OFFICE VISIT (OUTPATIENT)
Dept: UROLOGY | Age: 60
End: 2025-04-10
Payer: COMMERCIAL

## 2025-04-10 VITALS
HEIGHT: 69 IN | DIASTOLIC BLOOD PRESSURE: 72 MMHG | WEIGHT: 222 LBS | SYSTOLIC BLOOD PRESSURE: 120 MMHG | BODY MASS INDEX: 32.88 KG/M2

## 2025-04-10 DIAGNOSIS — R97.20 ELEVATED PSA: Primary | ICD-10-CM

## 2025-04-10 PROCEDURE — G8417 CALC BMI ABV UP PARAM F/U: HCPCS | Performed by: NURSE PRACTITIONER

## 2025-04-10 PROCEDURE — 3017F COLORECTAL CA SCREEN DOC REV: CPT | Performed by: NURSE PRACTITIONER

## 2025-04-10 PROCEDURE — 99214 OFFICE O/P EST MOD 30 MIN: CPT | Performed by: NURSE PRACTITIONER

## 2025-04-10 PROCEDURE — G8428 CUR MEDS NOT DOCUMENT: HCPCS | Performed by: NURSE PRACTITIONER

## 2025-04-10 PROCEDURE — 1036F TOBACCO NON-USER: CPT | Performed by: NURSE PRACTITIONER

## 2025-04-10 RX ORDER — TADALAFIL 5 MG/1
5 TABLET ORAL DAILY
Qty: 90 TABLET | Refills: 1 | Status: SHIPPED | OUTPATIENT
Start: 2025-04-10

## 2025-04-10 ASSESSMENT — ENCOUNTER SYMPTOMS
VOMITING: 0
BACK PAIN: 0
NAUSEA: 0
ABDOMINAL PAIN: 0

## 2025-05-08 ENCOUNTER — ANESTHESIA (OUTPATIENT)
Dept: OPERATING ROOM | Age: 60
End: 2025-05-08
Payer: COMMERCIAL

## 2025-05-08 ENCOUNTER — ANESTHESIA EVENT (OUTPATIENT)
Dept: OPERATING ROOM | Age: 60
End: 2025-05-08
Payer: COMMERCIAL

## 2025-05-08 ENCOUNTER — HOSPITAL ENCOUNTER (OUTPATIENT)
Age: 60
Setting detail: OUTPATIENT SURGERY
Discharge: HOME | End: 2025-05-08
Attending: OPHTHALMOLOGY | Admitting: OPHTHALMOLOGY
Payer: COMMERCIAL

## 2025-05-08 VITALS
WEIGHT: 220 LBS | TEMPERATURE: 97.5 F | BODY MASS INDEX: 32.58 KG/M2 | OXYGEN SATURATION: 99 % | DIASTOLIC BLOOD PRESSURE: 53 MMHG | SYSTOLIC BLOOD PRESSURE: 106 MMHG | RESPIRATION RATE: 18 BRPM | HEIGHT: 69 IN | HEART RATE: 57 BPM

## 2025-05-08 PROCEDURE — 3600000014 HC SURGERY LEVEL 4 ADDTL 15MIN: Performed by: OPHTHALMOLOGY

## 2025-05-08 PROCEDURE — 6370000000 HC RX 637 (ALT 250 FOR IP): Performed by: OPHTHALMOLOGY

## 2025-05-08 PROCEDURE — 6360000002 HC RX W HCPCS: Performed by: OPHTHALMOLOGY

## 2025-05-08 PROCEDURE — 6360000002 HC RX W HCPCS: Performed by: NURSE ANESTHETIST, CERTIFIED REGISTERED

## 2025-05-08 PROCEDURE — 2580000003 HC RX 258: Performed by: ANESTHESIOLOGY

## 2025-05-08 PROCEDURE — 2580000003 HC RX 258: Performed by: NURSE ANESTHETIST, CERTIFIED REGISTERED

## 2025-05-08 PROCEDURE — 3700000000 HC ANESTHESIA ATTENDED CARE: Performed by: OPHTHALMOLOGY

## 2025-05-08 PROCEDURE — 7100000011 HC PHASE II RECOVERY - ADDTL 15 MIN: Performed by: OPHTHALMOLOGY

## 2025-05-08 PROCEDURE — 2709999900 HC NON-CHARGEABLE SUPPLY: Performed by: OPHTHALMOLOGY

## 2025-05-08 PROCEDURE — 2720000010 HC SURG SUPPLY STERILE: Performed by: OPHTHALMOLOGY

## 2025-05-08 PROCEDURE — 3600000004 HC SURGERY LEVEL 4 BASE: Performed by: OPHTHALMOLOGY

## 2025-05-08 PROCEDURE — 7100000010 HC PHASE II RECOVERY - FIRST 15 MIN: Performed by: OPHTHALMOLOGY

## 2025-05-08 PROCEDURE — 2500000003 HC RX 250 WO HCPCS: Performed by: OPHTHALMOLOGY

## 2025-05-08 PROCEDURE — 3700000001 HC ADD 15 MINUTES (ANESTHESIA): Performed by: OPHTHALMOLOGY

## 2025-05-08 RX ORDER — CEFTAZIDIME 1 G/1
INJECTION, POWDER, FOR SOLUTION INTRAMUSCULAR; INTRAVENOUS PRN
Status: DISCONTINUED | OUTPATIENT
Start: 2025-05-08 | End: 2025-05-08 | Stop reason: HOSPADM

## 2025-05-08 RX ORDER — ERYTHROMYCIN 5 MG/G
OINTMENT OPHTHALMIC PRN
Status: DISCONTINUED | OUTPATIENT
Start: 2025-05-08 | End: 2025-05-08 | Stop reason: HOSPADM

## 2025-05-08 RX ORDER — ONDANSETRON 2 MG/ML
INJECTION INTRAMUSCULAR; INTRAVENOUS
Status: DISCONTINUED | OUTPATIENT
Start: 2025-05-08 | End: 2025-05-08

## 2025-05-08 RX ORDER — LORAZEPAM 2 MG/ML
0.5 INJECTION INTRAMUSCULAR
Status: DISCONTINUED | OUTPATIENT
Start: 2025-05-08 | End: 2025-05-08 | Stop reason: HOSPADM

## 2025-05-08 RX ORDER — TOBRAMYCIN AND DEXAMETHASONE 3; 1 MG/ML; MG/ML
1 SUSPENSION/ DROPS OPHTHALMIC ONCE
Status: COMPLETED | OUTPATIENT
Start: 2025-05-08 | End: 2025-05-08

## 2025-05-08 RX ORDER — PROPOFOL 10 MG/ML
INJECTION, EMULSION INTRAVENOUS
Status: DISCONTINUED | OUTPATIENT
Start: 2025-05-08 | End: 2025-05-08

## 2025-05-08 RX ORDER — SODIUM CHLORIDE 0.9 % (FLUSH) 0.9 %
5-40 SYRINGE (ML) INJECTION EVERY 12 HOURS SCHEDULED
Status: DISCONTINUED | OUTPATIENT
Start: 2025-05-08 | End: 2025-05-08 | Stop reason: HOSPADM

## 2025-05-08 RX ORDER — WATER 10 ML/10ML
INJECTION INTRAMUSCULAR; INTRAVENOUS; SUBCUTANEOUS PRN
Status: DISCONTINUED | OUTPATIENT
Start: 2025-05-08 | End: 2025-05-08 | Stop reason: HOSPADM

## 2025-05-08 RX ORDER — DROPERIDOL 2.5 MG/ML
0.62 INJECTION, SOLUTION INTRAMUSCULAR; INTRAVENOUS
Status: DISCONTINUED | OUTPATIENT
Start: 2025-05-08 | End: 2025-05-08 | Stop reason: HOSPADM

## 2025-05-08 RX ORDER — SODIUM CHLORIDE, SODIUM LACTATE, POTASSIUM CHLORIDE, CALCIUM CHLORIDE 600; 310; 30; 20 MG/100ML; MG/100ML; MG/100ML; MG/100ML
INJECTION, SOLUTION INTRAVENOUS CONTINUOUS
Status: DISCONTINUED | OUTPATIENT
Start: 2025-05-08 | End: 2025-05-08 | Stop reason: HOSPADM

## 2025-05-08 RX ORDER — LIDOCAINE HYDROCHLORIDE 10 MG/ML
INJECTION, SOLUTION EPIDURAL; INFILTRATION; INTRACAUDAL; PERINEURAL
Status: DISCONTINUED | OUTPATIENT
Start: 2025-05-08 | End: 2025-05-08 | Stop reason: SDUPTHER

## 2025-05-08 RX ORDER — DIPHENHYDRAMINE HYDROCHLORIDE 50 MG/ML
12.5 INJECTION, SOLUTION INTRAMUSCULAR; INTRAVENOUS
Status: DISCONTINUED | OUTPATIENT
Start: 2025-05-08 | End: 2025-05-08 | Stop reason: HOSPADM

## 2025-05-08 RX ORDER — LABETALOL HYDROCHLORIDE 5 MG/ML
10 INJECTION, SOLUTION INTRAVENOUS
Status: DISCONTINUED | OUTPATIENT
Start: 2025-05-08 | End: 2025-05-08 | Stop reason: HOSPADM

## 2025-05-08 RX ORDER — HYDRALAZINE HYDROCHLORIDE 20 MG/ML
10 INJECTION INTRAMUSCULAR; INTRAVENOUS
Status: DISCONTINUED | OUTPATIENT
Start: 2025-05-08 | End: 2025-05-08 | Stop reason: HOSPADM

## 2025-05-08 RX ORDER — TROPICAMIDE 10 MG/ML
1 SOLUTION/ DROPS OPHTHALMIC
Status: COMPLETED | OUTPATIENT
Start: 2025-05-08 | End: 2025-05-08

## 2025-05-08 RX ORDER — PHENYLEPHRINE HYDROCHLORIDE 100 MG/ML
1 SOLUTION/ DROPS OPHTHALMIC
Status: COMPLETED | OUTPATIENT
Start: 2025-05-08 | End: 2025-05-08

## 2025-05-08 RX ORDER — PROPOFOL 10 MG/ML
INJECTION, EMULSION INTRAVENOUS
Status: DISCONTINUED | OUTPATIENT
Start: 2025-05-08 | End: 2025-05-08 | Stop reason: SDUPTHER

## 2025-05-08 RX ORDER — NALOXONE HYDROCHLORIDE 0.4 MG/ML
INJECTION, SOLUTION INTRAMUSCULAR; INTRAVENOUS; SUBCUTANEOUS PRN
Status: DISCONTINUED | OUTPATIENT
Start: 2025-05-08 | End: 2025-05-08 | Stop reason: HOSPADM

## 2025-05-08 RX ORDER — MIDAZOLAM HYDROCHLORIDE 1 MG/ML
INJECTION, SOLUTION INTRAMUSCULAR; INTRAVENOUS
Status: DISCONTINUED | OUTPATIENT
Start: 2025-05-08 | End: 2025-05-08 | Stop reason: SDUPTHER

## 2025-05-08 RX ORDER — SODIUM CHLORIDE, SODIUM LACTATE, POTASSIUM CHLORIDE, CALCIUM CHLORIDE 600; 310; 30; 20 MG/100ML; MG/100ML; MG/100ML; MG/100ML
INJECTION, SOLUTION INTRAVENOUS
Status: DISCONTINUED | OUTPATIENT
Start: 2025-05-08 | End: 2025-05-08 | Stop reason: SDUPTHER

## 2025-05-08 RX ORDER — SODIUM CHLORIDE 0.9 % (FLUSH) 0.9 %
5-40 SYRINGE (ML) INJECTION PRN
Status: DISCONTINUED | OUTPATIENT
Start: 2025-05-08 | End: 2025-05-08 | Stop reason: HOSPADM

## 2025-05-08 RX ORDER — PROCHLORPERAZINE EDISYLATE 5 MG/ML
5 INJECTION INTRAMUSCULAR; INTRAVENOUS
Status: DISCONTINUED | OUTPATIENT
Start: 2025-05-08 | End: 2025-05-08 | Stop reason: HOSPADM

## 2025-05-08 RX ORDER — FENTANYL CITRATE 50 UG/ML
INJECTION, SOLUTION INTRAMUSCULAR; INTRAVENOUS
Status: DISCONTINUED | OUTPATIENT
Start: 2025-05-08 | End: 2025-05-08 | Stop reason: SDUPTHER

## 2025-05-08 RX ORDER — FENTANYL CITRATE 50 UG/ML
25 INJECTION, SOLUTION INTRAMUSCULAR; INTRAVENOUS EVERY 5 MIN PRN
Status: DISCONTINUED | OUTPATIENT
Start: 2025-05-08 | End: 2025-05-08 | Stop reason: HOSPADM

## 2025-05-08 RX ORDER — TROPICAMIDE 10 MG/ML
SOLUTION/ DROPS OPHTHALMIC PRN
Status: DISCONTINUED | OUTPATIENT
Start: 2025-05-08 | End: 2025-05-08 | Stop reason: HOSPADM

## 2025-05-08 RX ORDER — OXYCODONE HYDROCHLORIDE 5 MG/1
10 TABLET ORAL PRN
Status: DISCONTINUED | OUTPATIENT
Start: 2025-05-08 | End: 2025-05-08 | Stop reason: HOSPADM

## 2025-05-08 RX ORDER — TOBRAMYCIN AND DEXAMETHASONE 3; 1 MG/ML; MG/ML
1 SUSPENSION/ DROPS OPHTHALMIC ONCE
Status: DISCONTINUED | OUTPATIENT
Start: 2025-05-08 | End: 2025-05-08

## 2025-05-08 RX ORDER — SODIUM CHLORIDE 9 MG/ML
INJECTION, SOLUTION INTRAVENOUS PRN
Status: DISCONTINUED | OUTPATIENT
Start: 2025-05-08 | End: 2025-05-08 | Stop reason: HOSPADM

## 2025-05-08 RX ORDER — OXYCODONE HYDROCHLORIDE 5 MG/1
5 TABLET ORAL PRN
Status: DISCONTINUED | OUTPATIENT
Start: 2025-05-08 | End: 2025-05-08 | Stop reason: HOSPADM

## 2025-05-08 RX ORDER — ONDANSETRON 2 MG/ML
INJECTION INTRAMUSCULAR; INTRAVENOUS
Status: DISCONTINUED | OUTPATIENT
Start: 2025-05-08 | End: 2025-05-08 | Stop reason: SDUPTHER

## 2025-05-08 RX ADMIN — TROPICAMIDE 1 DROP: 10 SOLUTION/ DROPS OPHTHALMIC at 07:39

## 2025-05-08 RX ADMIN — FENTANYL CITRATE 50 MCG: 50 INJECTION, SOLUTION INTRAMUSCULAR; INTRAVENOUS at 10:56

## 2025-05-08 RX ADMIN — PHENYLEPHRINE HYDROCHLORIDE 1 DROP: 100 SOLUTION/ DROPS OPHTHALMIC at 07:38

## 2025-05-08 RX ADMIN — SODIUM CHLORIDE, POTASSIUM CHLORIDE, SODIUM LACTATE AND CALCIUM CHLORIDE: 600; 310; 30; 20 INJECTION, SOLUTION INTRAVENOUS at 08:08

## 2025-05-08 RX ADMIN — TOBRAMYCIN AND DEXAMETHASONE 1 DROP: 3; 1 SUSPENSION/ DROPS OPHTHALMIC at 07:55

## 2025-05-08 RX ADMIN — PHENYLEPHRINE HYDROCHLORIDE 1 DROP: 100 SOLUTION/ DROPS OPHTHALMIC at 07:55

## 2025-05-08 RX ADMIN — LIDOCAINE HYDROCHLORIDE 50 MG: 10 INJECTION, SOLUTION EPIDURAL; INFILTRATION; INTRACAUDAL; PERINEURAL at 10:56

## 2025-05-08 RX ADMIN — MIDAZOLAM 2 MG: 1 INJECTION INTRAMUSCULAR; INTRAVENOUS at 10:53

## 2025-05-08 RX ADMIN — PROPOFOL 30 MG: 10 INJECTION, EMULSION INTRAVENOUS at 11:02

## 2025-05-08 RX ADMIN — PHENYLEPHRINE HYDROCHLORIDE 1 DROP: 100 SOLUTION/ DROPS OPHTHALMIC at 07:28

## 2025-05-08 RX ADMIN — ONDANSETRON 4 MG: 2 INJECTION INTRAMUSCULAR; INTRAVENOUS at 11:09

## 2025-05-08 RX ADMIN — TROPICAMIDE 1 DROP: 10 SOLUTION/ DROPS OPHTHALMIC at 07:56

## 2025-05-08 RX ADMIN — SODIUM CHLORIDE, POTASSIUM CHLORIDE, SODIUM LACTATE AND CALCIUM CHLORIDE: 600; 310; 30; 20 INJECTION, SOLUTION INTRAVENOUS at 10:50

## 2025-05-08 RX ADMIN — TROPICAMIDE 1 DROP: 10 SOLUTION/ DROPS OPHTHALMIC at 07:28

## 2025-05-08 RX ADMIN — FENTANYL CITRATE 50 MCG: 50 INJECTION, SOLUTION INTRAMUSCULAR; INTRAVENOUS at 10:54

## 2025-05-08 RX ADMIN — PROPOFOL 10 MG: 10 INJECTION, EMULSION INTRAVENOUS at 11:07

## 2025-05-08 ASSESSMENT — PAIN - FUNCTIONAL ASSESSMENT: PAIN_FUNCTIONAL_ASSESSMENT: NONE - DENIES PAIN

## 2025-05-08 NOTE — OP NOTE
[address] [address]                            OPERATIVE REPORT      PATIENT NAME: HOLLI COLLAZO            : 1965  MED REC NO: [id number]                         ROOM: [facility]  ACCOUNT NO: [id number]                       ADMIT DATE: 2025  PROVIDER: Rajesh Ramon MD      DATE OF PROCEDURE:  2025    SURGEON:  Rajesh Ramon MD    PREOPERATIVE DIAGNOSES:    1. Intraocular silicone oil ***, left eye.  2. Previous status post retinal surgeries for retinal detachment with proliferative vitreal retinopathy, left eye.    POSTOPERATIVE DIAGNOSES:    1. Intraocular silicone oil ***, left eye.  2. Previous status post retinal surgeries for retinal detachment with proliferative vitreal retinopathy, left eye.    PROCEDURES:  Pars plana vitrectomy with silicone oil removal and gas fluid exchanges and intravitreal methotrexate injections 400 mcg in 0.1 mL.    ANESTHESIA:  Monitored.    BLOOD LOSS:  Minimal.    COMPLICATIONS:  No complications.    SPECIMENS:  No specimens.    REASON FOR OPERATION:  The patient has had 2 previous retinal procedures, retinal detachment with proliferative vitreal retinopathy.  He has silicone oil in the vitreous.  He has elected to undergo surgery to remove the silicone oil to prevent long-term side effects.  He understands risks included but not limited to bleeding, infection, and recurrent retinal detachment.    DESCRIPTION OF PROCEDURE:  The patient was brought to the operating room in good condition.  He was administered local anesthetic, and prepped and draped in the usual fashion.  A 23-gauge vitrectomy trocar was placed through the pars plana in the usual quadrants.  Infusion attached to inferotemporal site.  Light pipe and silicone  were placed through the other trocars.  The silicone oil was removed without complications.  Three air-fluid exchanges were

## 2025-05-08 NOTE — H&P
History and Physical    Pt Name: Toño Espinal  MRN: [id number]  YOB: 1965  Date of evaluation: 5/8/2025  Primary Care Physician: Anastacio Boyce MD    SUBJECTIVE:   History of Chief Complaint:    Toño Espinal is a 59 y.o. male who is scheduled today forVITRECTOMY 23 GAUGE, SILICONE OIL REMOVAL, METHOTREXATE INJECTION - Left.   He reports he is s/p left vitrectomy, scleral buckle, oil injection on 1/30/25  and presents today to have oil removed. He is also s/p left vitrectomy on 12/5/24 with Dr. Ramon for left retinal detachment. He describes his left vision as blurry. Denies any pain.   Allergies  is allergic to latex and demerol [meperidine hcl].  Medications  Prior to Admission medications    Medication Sig Start Date End Date Taking? Authorizing Provider   tadalafil (CIALIS) 5 MG tablet Take 1 tablet by mouth daily 4/10/25  Yes Bryanna Forbes, APRN - CNP   Red Yeast Rice 600 MG CAPS Take 1 capsule by mouth daily   Yes Bishop Lopez MD   VITAMIN D PO Take by mouth   Yes Bishop Lopez MD   Multiple Vitamins-Minerals (THERAPEUTIC MULTIVITAMIN-MINERALS) tablet Take 1 tablet by mouth daily   Yes ProviderBishop MD     Past Medical History    has a past medical history of Detached retina, right, ED (erectile dysfunction), Elevated PSA, History of BPH, Retinal detachment, Under care of team, Under care of team, and Wellness examination.  Past Surgical History   has a past surgical history that includes Tonsillectomy; Colonoscopy (2016); Appendectomy (09/08/2012); Knee arthroscopy (12/2017); vitrectomy (Right, 02/16/2019); Inguinal hernia repair (Bilateral); Elbow fracture surgery (Left, 1970); Cataract extraction w/ intraocular lens implant (Right, 12/05/2024); Prostate biopsy (08/2020); vitrectomy (Left, 12/05/2024); vitrectomy (Left, 01/30/2025); and cyst removal (2000).  Social History   reports that he has never smoked. He has never used smokeless

## 2025-05-08 NOTE — ANESTHESIA PRE PROCEDURE
Department of Anesthesiology  Preprocedure Note       Name:  Toño Espinal   Age:  59 y.o.  :  1965                                          MRN:  [id number]         Date:  2025      Surgeon: Surgeon(s):  Rajesh Ramon MD    Procedure: Procedure(s):  VITRECTOMY 23 GAUGE, SILICONE OIL REMOVAL, METHOTREXATE INJECTION    Medications prior to admission:   Prior to Admission medications    Medication Sig Start Date End Date Taking? Authorizing Provider   tadalafil (CIALIS) 5 MG tablet Take 1 tablet by mouth daily 4/10/25   Bryanna Forbes, APRN - CNP   Red Yeast Rice 600 MG CAPS Take 1 capsule by mouth daily    ProviderBishop MD   VITAMIN D PO Take by mouth    ProviderBishop MD   Multiple Vitamins-Minerals (THERAPEUTIC MULTIVITAMIN-MINERALS) tablet Take 1 tablet by mouth daily    ProviderBishop MD       Current medications:    No current facility-administered medications for this encounter.       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 Ramon , last seen 2025   • Under care of team     Urology Lauren , last seen 4/10/2025   • Wellness examination     PCP Pierce , last seen 2025       Past Surgical History:        Procedure Laterality Date   • APPENDECTOMY  2012   • CATARACT EXTRACTION W/ INTRAOCULAR LENS IMPLANT Right 2024   • COLONOSCOPY  2016    due    • CYST REMOVAL

## 2025-05-09 NOTE — ANESTHESIA POSTPROCEDURE EVALUATION
Department of Anesthesiology  Postprocedure Note    Patient: Toño Espinal  MRN: [id number]  YOB: 1965  Date of evaluation: 5/9/2025    Procedure Summary       Date: 05/08/25 Room / Location: [address]    Anesthesia Start: 1050 Anesthesia Stop: 1133    Procedure: VITRECTOMY 23 GAUGE, SILICONE OIL REMOVAL, METHOTREXATE INJECTION (Left) Diagnosis:       Left retinal detachment      (Left retinal detachment [H33.22])    Surgeons: Rajesh Ramon MD Responsible Provider: Umair Islas MD    Anesthesia Type: MAC ASA Status: 2            Anesthesia Type: MAC    Skyler Phase I: Skyler Score: 10    Skyler Phase II: Skyler Score: 10    Anesthesia Post Evaluation    Patient location during evaluation: PACU  Patient participation: complete - patient participated  Level of consciousness: awake  Airway patency: patent  Nausea & Vomiting: no nausea and no vomiting  Cardiovascular status: blood pressure returned to baseline  Respiratory status: acceptable  Hydration status: euvolemic  Comments: BP (!) 106/53   Pulse 57   Temp 97.5 °F (36.4 °C) (Temporal)   Resp 18   Ht 1.753 m (5' 9\")   Wt 99.8 kg (220 lb)   SpO2 99%   BMI 32.49 kg/m²   Pain Assessment: None - Denies Pain          No notable events documented.

## 2025-07-10 ENCOUNTER — OFFICE VISIT (OUTPATIENT)
Dept: UROLOGY | Age: 60
End: 2025-07-10
Payer: COMMERCIAL

## 2025-07-10 VITALS
HEIGHT: 69 IN | BODY MASS INDEX: 32.22 KG/M2 | DIASTOLIC BLOOD PRESSURE: 100 MMHG | SYSTOLIC BLOOD PRESSURE: 142 MMHG | WEIGHT: 217.5 LBS

## 2025-07-10 DIAGNOSIS — R97.20 ELEVATED PSA: Primary | ICD-10-CM

## 2025-07-10 PROCEDURE — G8428 CUR MEDS NOT DOCUMENT: HCPCS | Performed by: NURSE PRACTITIONER

## 2025-07-10 PROCEDURE — 1036F TOBACCO NON-USER: CPT | Performed by: NURSE PRACTITIONER

## 2025-07-10 PROCEDURE — 99214 OFFICE O/P EST MOD 30 MIN: CPT | Performed by: NURSE PRACTITIONER

## 2025-07-10 PROCEDURE — G8417 CALC BMI ABV UP PARAM F/U: HCPCS | Performed by: NURSE PRACTITIONER

## 2025-07-10 PROCEDURE — 3017F COLORECTAL CA SCREEN DOC REV: CPT | Performed by: NURSE PRACTITIONER

## 2025-07-10 RX ORDER — PREDNISOLONE ACETATE 10 MG/ML
SUSPENSION/ DROPS OPHTHALMIC
COMMUNITY
Start: 2025-06-25

## 2025-07-10 RX ORDER — MOXIFLOXACIN 5 MG/ML
SOLUTION/ DROPS OPHTHALMIC
COMMUNITY
Start: 2025-06-25

## 2025-07-10 RX ORDER — TADALAFIL 5 MG/1
5 TABLET ORAL DAILY
Qty: 90 TABLET | Refills: 3 | Status: SHIPPED | OUTPATIENT
Start: 2025-07-10

## 2025-07-10 ASSESSMENT — ENCOUNTER SYMPTOMS
ABDOMINAL PAIN: 0
NAUSEA: 0
VOMITING: 0
BACK PAIN: 0

## 2025-07-10 NOTE — PROGRESS NOTES
[facility] [place] [facility]  [facility]  [address]  [address]  [facility] [address]  Dept: [phone]  Loc: [phone]    Visit Date: 7/10/2025        HPI:     Toño Espinal is a 59 y.o. male who presents today for:  Chief Complaint   Patient presents with    Elevated PSA    Erectile Dysfunction     Here for follow up after starting new medication.        HPI    Pt seen in follow up for elevated psa.      Prostate biopsy 8/2020 (PSA 4.8) by Dr. Flores negative for malignancy.  PSA has oscillated from 4.7-5.6 since 2020.       Sildenafil 25-50 mg prn limited spontaneity and had some nasal congestion and headaches at times.  Started on Cialis 5 mg daily at OV 4/2025.      Reports the cialis is working well for the ED and has improved spontaneity.  Notes some improvement in mild LUTS as well.        Current Outpatient Medications   Medication Sig Dispense Refill    moxifloxacin (VIGAMOX) 0.5 % ophthalmic solution Instill 1 drop into left eye four times a day as directed PLEASE SEE THE INSTRUCTIONS SENT HOME WITH YOU AT YOUR LAST VISIT.      prednisoLONE acetate (PRED FORTE) 1 % ophthalmic suspension INSTILL 1 DROP INTO LEFT EYE FOUR TIMES A DAY SEE SURGERY INSTRUCTIONS SENT HOME WITH YOU AT YOUR LAST VISIT.      tadalafil (CIALIS) 5 MG tablet Take 1 tablet by mouth daily 90 tablet 1    Red Yeast Rice 600 MG CAPS Take 1 capsule by mouth daily      VITAMIN D PO Take by mouth      Multiple Vitamins-Minerals (THERAPEUTIC MULTIVITAMIN-MINERALS) tablet Take 1 tablet by mouth daily       No current facility-administered medications for this visit.       Past Medical History  Toño  has a past medical history of Detached retina, right, ED (erectile dysfunction), Elevated PSA, History of BPH, Retinal detachment, Under care of team, Under care of team, and Wellness examination.    Past Surgical History  The patient  has a past surgical history that includes Tonsillectomy;

## 2025-07-15 LAB
ALBUMIN: 4.3 G/DL
ALP BLD-CCNC: 74 U/L
ALT SERPL-CCNC: 24 U/L
ANION GAP SERPL CALCULATED.3IONS-SCNC: 10 MMOL/L
AST SERPL-CCNC: 24 U/L
BASOPHILS ABSOLUTE: 0.1 /ΜL
BASOPHILS RELATIVE PERCENT: 1.1 %
BILIRUB SERPL-MCNC: 0.5 MG/DL (ref 0.1–1.4)
BUN BLDV-MCNC: 18 MG/DL
CALCIUM SERPL-MCNC: 9.9 MG/DL
CHLORIDE BLD-SCNC: 102 MMOL/L
CO2: 28 MMOL/L
CREAT SERPL-MCNC: 1 MG/DL
EOSINOPHILS ABSOLUTE: 0.2 /ΜL
EOSINOPHILS RELATIVE PERCENT: 3.4 %
GFR, ESTIMATED: 86
GLUCOSE BLD-MCNC: 98 MG/DL
HCT VFR BLD CALC: 52.7 % (ref 41–53)
HEMOGLOBIN: 17.5 G/DL (ref 13.5–17.5)
LYMPHOCYTES ABSOLUTE: 2.3 /ΜL
LYMPHOCYTES RELATIVE PERCENT: 43.5 %
MCH RBC QN AUTO: 31.4 PG
MCHC RBC AUTO-ENTMCNC: 33.2 G/DL
MCV RBC AUTO: 94.6 FL
MONOCYTES ABSOLUTE: 0.5 /ΜL
MONOCYTES RELATIVE PERCENT: 9.9 %
NEUTROPHILS ABSOLUTE: 2.2 /ΜL
NEUTROPHILS RELATIVE PERCENT: 41.9 %
PLATELET # BLD: 248 K/ΜL
PMV BLD AUTO: 11.2 FL
POTASSIUM SERPL-SCNC: 5.6 MMOL/L
RBC # BLD: 5.57 10^6/ΜL
SODIUM BLD-SCNC: 140 MMOL/L
TOTAL PROTEIN: 7.5 G/DL (ref 6.4–8.2)
TSH SERPL DL<=0.05 MIU/L-ACNC: 1.24 UIU/ML
WBC # BLD: 5.3 10^3/ML

## 2025-07-22 ENCOUNTER — RESULTS FOLLOW-UP (OUTPATIENT)
Dept: FAMILY MEDICINE CLINIC | Age: 60
End: 2025-07-22

## 2025-07-22 NOTE — TELEPHONE ENCOUNTER
----- Message from Dr. Anastacio Boyce MD sent at 7/22/2025  6:20 AM EDT -----  Call as labs  sable and keep appt  on 7-24-25

## 2025-07-24 ENCOUNTER — OFFICE VISIT (OUTPATIENT)
Dept: FAMILY MEDICINE CLINIC | Age: 60
End: 2025-07-24

## 2025-07-24 VITALS
BODY MASS INDEX: 32.33 KG/M2 | SYSTOLIC BLOOD PRESSURE: 148 MMHG | HEIGHT: 69 IN | WEIGHT: 218.25 LBS | HEART RATE: 60 BPM | RESPIRATION RATE: 16 BRPM | DIASTOLIC BLOOD PRESSURE: 88 MMHG

## 2025-07-24 DIAGNOSIS — R97.20 ELEVATED PSA: ICD-10-CM

## 2025-07-24 DIAGNOSIS — Z00.00 WELL ADULT EXAM: Primary | ICD-10-CM

## 2025-07-24 DIAGNOSIS — H33.021 RETINAL DETACHMENT OF RIGHT EYE WITH MULTIPLE RETINAL TEARS: ICD-10-CM

## 2025-07-24 ASSESSMENT — ENCOUNTER SYMPTOMS
CONSTIPATION: 0
ABDOMINAL PAIN: 0
COUGH: 0
BACK PAIN: 0
SORE THROAT: 0
SHORTNESS OF BREATH: 0
NAUSEA: 0
EYE PAIN: 0
BLOOD IN STOOL: 0
CHEST TIGHTNESS: 0
TROUBLE SWALLOWING: 0

## 2025-07-24 NOTE — PROGRESS NOTES
ophthalmic suspension INSTILL 1 DROP INTO LEFT EYE FOUR TIMES A DAY SEE SURGERY INSTRUCTIONS SENT HOME WITH YOU AT YOUR LAST VISIT.      tadalafil (CIALIS) 5 MG tablet Take 1 tablet by mouth daily 90 tablet 3    Red Yeast Rice 600 MG CAPS Take 1 capsule by mouth daily      VITAMIN D PO Take by mouth      Multiple Vitamins-Minerals (THERAPEUTIC MULTIVITAMIN-MINERALS) tablet Take 1 tablet by mouth daily       No current facility-administered medications for this visit.      There are no diagnoses linked to this encounter.     No follow-ups on file.       Bph  as  psa  slight  help        See in  4  mths     bp   check    weight  loss and    repeat    EKG       Bp  checks and  ?  Will  rx

## 2025-07-25 RX ORDER — TADALAFIL 5 MG/1
5 TABLET ORAL DAILY
Qty: 90 TABLET | Refills: 3 | Status: SHIPPED | OUTPATIENT
Start: 2025-07-25

## 2025-07-25 NOTE — TELEPHONE ENCOUNTER
Toño Espinal called requesting a refill on the following medications:  Requested Prescriptions     Pending Prescriptions Disp Refills    tadalafil (CIALIS) 5 MG tablet 90 tablet 3     Sig: Take 1 tablet by mouth daily     Pharmacy verified:  [email or web address]      Date of last visit: 07/10/2025  Date of next visit (if applicable): 04/23/2026

## (undated) DEVICE — Device

## (undated) DEVICE — PROBE OPHTH LSR STR STRL 23GA

## (undated) DEVICE — GARMENT,MEDLINE,DVT,INT,CALF,MED, GEN2: Brand: MEDLINE

## (undated) DEVICE — RETINA PK

## (undated) DEVICE — STANDARD HYPODERMIC NEEDLE,ALUMINUM HUB: Brand: MONOJECT

## (undated) DEVICE — DISPOSABLE BIPOLAR CABLE, 12FT (3.6M): Brand: KIRWAN

## (undated) DEVICE — NEEDLE HYPO 30GA L0.5IN BGE POLYPR HUB S STL REG BVL STR

## (undated) DEVICE — REVOLUTION DSP 25G ILM FORCEPS: Brand: ALCON GRIESHABER REVOLUTION

## (undated) DEVICE — VISCOUS FLUID CONTROL PAK: Brand: CONSTELLATION

## (undated) DEVICE — 23 GA WIDE ANGLE ENDOILLUMINATOR: Brand: ENGAUGE

## (undated) DEVICE — 1 EACH 40411 STERILE DISPOSABLE SUPER VIEW® LENS SET & 1 EACH 40100 STERILE MICROSCOPE DRAPE: Brand: SUPER VIEW® PACK

## (undated) DEVICE — CAUTERY SURG 23GA BPLR 6 PER BX

## (undated) DEVICE — OCCUCOAT SYRINGE 1ML 6PK: Brand: OCCUCOAT

## (undated) DEVICE — SURGICAL PROCEDURE PACK 25 GA VITRECTOMY

## (undated) DEVICE — BLADE SURG 90DEG BVL UP LAMLLR

## (undated) DEVICE — SYRINGE MED 10ML LUERLOCK TIP W/O SFTY DISP

## (undated) DEVICE — SOLUTION IRRIGATION BAL SALT SOLUTION 500 ML BTL 6/CA BSS +

## (undated) DEVICE — NEEDLE FLTR 18GA L1.5IN MEM THK5UM BLNT DISP

## (undated) DEVICE — SUTURE ETHILON SZ 5-0 L18IN NONABSORBABLE BLK S-14 L8MM 1/4 7731G

## (undated) DEVICE — SUTURE CHROMIC GUT SZ 7-0 L18IN ABSRB BLU L6.5MM TG140-8 1797G

## (undated) DEVICE — STRAP,POSITIONING,KNEE/BODY,FOAM,4X60": Brand: MEDLINE

## (undated) DEVICE — DRESSING TRNSPAR W5XL4.5IN FLM SHT SEMIPERMEABLE WIND

## (undated) DEVICE — SYRINGE MEDICAL 3ML CLEAR PLASTIC STANDARD NON CONTROL LUERLOCK TIP DISPOSABLE

## (undated) DEVICE — SYRINGE MED 50ML LUERLOCK TIP

## (undated) DEVICE — GLOVE ORANGE PI 7 1/2   MSG9075

## (undated) DEVICE — CAUTERY BPLR 25GA INTOCU W/ HNDPC CRD DISP FOR CX9404

## (undated) DEVICE — DRAPE,REIN 53X77,STERILE: Brand: MEDLINE

## (undated) DEVICE — DISCONTINUED USE 435154 INVENTORY EXISTS CSFILTER SYRINGE BL ST SLGS033SS (50/BX)

## (undated) DEVICE — PROBE BPLR 18GA 45DEG ANG BLNT TIP HEMSTAT ERAS WET-FIELD 10/BX 10/SP

## (undated) DEVICE — SYRINGE MED 5ML STD CLR PLAS LUERLOCK TIP N CTRL DISP

## (undated) DEVICE — SYRINGE ST FILTERS W/MCE MEMBRAN

## (undated) DEVICE — SUTURE CHROMIC GUT 6-0 ETH1731G

## (undated) DEVICE — 23GA EZPASS SOFT TIP CANNULA BOX OF 5: Brand: VORTEX SURGICAL INC

## (undated) DEVICE — SOLUTION IRRIG 1000ML PENTALYTE PLAS POUR BTL TIS U SOL

## (undated) DEVICE — 25 GA FLEXIBLE TIP LASER PROBE: Brand: ALCON, ENGAUGE

## (undated) DEVICE — 23 GA FLEXIBLE TIP LASER PROBE: Brand: ALCON, ENGAUGE

## (undated) DEVICE — SUTURE PERMAHAND SZ 4-0 L30IN NONABSORBABLE BLK L17MM RB-1 K871H

## (undated) DEVICE — 60 ML SYRINGE LUER-LOCK TIP: Brand: MONOJECT

## (undated) DEVICE — AGENT VISCOELASTIC 1ML 2% HYDROXYPROPYL METHCELL PRELD GLS

## (undated) DEVICE — SUTURE PLN GUT SZ 6-0 L18IN ABSRB YELLOWISH TAN L6.5MM 1735G

## (undated) DEVICE — SYRINGE, LUER LOCK, 10ML: Brand: MEDLINE

## (undated) DEVICE — SOLUTION IRRIG 500ML BAL SALT FLX BG BSS

## (undated) DEVICE — PREP SOL PVP IODINE 4%  4 OZ/BTL

## (undated) DEVICE — SOLUTION SURG PREP POV IOD 7.5% 4 OZ

## (undated) DEVICE — MICROSURGICAL INSTRUMENT 23GA SOFT TIP NEEDLE: Brand: ALCON

## (undated) DEVICE — STRIP,CLOSURE,WOUND,MEDI-STRIP,1/2X4: Brand: MEDLINE

## (undated) DEVICE — FORCEPS SURG 25GA FN TIP ECKARDT INT LIMITING MEMBRN PINN

## (undated) DEVICE — SURGICAL PROCEDURE PACK 23 GA VITRECTOMY

## (undated) DEVICE — 3 ML SYRINGE LUER-LOCK TIP: Brand: MONOJECT